# Patient Record
Sex: FEMALE | Race: WHITE | Employment: FULL TIME
[De-identification: names, ages, dates, MRNs, and addresses within clinical notes are randomized per-mention and may not be internally consistent; named-entity substitution may affect disease eponyms.]

---

## 2020-02-24 ENCOUNTER — HOSPITAL ENCOUNTER (OUTPATIENT)
Dept: PHYSICAL THERAPY | Facility: CLINIC | Age: 45
Setting detail: THERAPIES SERIES
Discharge: HOME | End: 2020-02-24
Payer: COMMERCIAL

## 2020-02-24 PROCEDURE — 97110 THERAPEUTIC EXERCISES: CPT

## 2020-02-24 PROCEDURE — 97140 MANUAL THERAPY 1/> REGIONS: CPT

## 2020-02-24 PROCEDURE — 97161 PT EVAL LOW COMPLEX 20 MIN: CPT

## 2020-02-24 NOTE — CONSULTS
[x] [facility]    [address] [place] [address]    Phone: [phone]    Fax:  [phone]     Physical Therapy Running Evaluation    Date:  2020  Patient: Oniel Pozo   : 1975  MRN: [id number]  Physician: Dr. Lucie Real: MMO(30 visits)  Medical Diagnosis: B knee pain    Rehab Codes: F51.715, M25.562  Onset date: 2/10/20     Next Dr's appt. : 20    Subjective:   CC: Pt reports that as she has been getting further into the SU[employer] program and ramping miles up she began having pain. Last weekend was 8 miles and when she was at 5 miles the knee pain started just below the knee cap. She was able to complete the run with some KT tape on but pain was 5/10. With ice post run it reduced to 2/10 and has resolved. She states she has cut back to 3 days per week of training as the day between gives time for it to calm down and she can run again. With faster paces it doesn't seem to hurt as much. Reported abimael on longer slower runs at 11:15 min/mile is in the high 150's to low 160's.       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 Drawer   []   Hip Flex WNL WNL   Post. Drawer   []   Ext WNL but stiff 10 4 4- Lachmans   []   ER WNL WNL 5 5 Valgus Stress   []   IR WNL WNL   Varus Stress   []   ABD WNL WNL 5 5- Bears   []   ADD     Pat-Fem Grind   []   Knee Flex WNL WNL 5 5 FADIRs   []   Ext WNL WNL   Hip Scouring   []   Ankle DF knee straight 5 3   MANJULAs   []   PF WNL WNL   Piriformis   []   INV WNL WNL   Tierras   []   EVER WNL WNL   Tomas   + + []   GTE WNL WNL   Abdon's   []       OBSERVATION No Deficit Deficit Not Tested Comments   Posture       Forward Head [] [] []    Rounded Shoulders [] [] []    Kyphosis [] [] []    Lordosis [] [] []    Scoliosis [] [] []    Iliac Crest [] [x] [] R anterior innom rotation   PSIS [] [] []    ASIS [] [] []    Genu Valgus [] [] []    Genu Varus [] [] []    Genu Recurvatum [] [] []    Pronation [] [] []    Supination [] [] []    Leg Length Discrp [] [] []    Slumped Sitting [] [] []    Palpation [] [x] [] Psoas, iliacus, glute med and piriformis spasm   Sensation [] [] []    Edema [] [] []    Neurological [] [] []    Patellar Mobility [] [] []    Patellar Orientation [] [] []    Gait [] [] [] Analysis:     Video Run Analysis- To be performed next visit as pt was 30 min late for her appt      FUNCTIONAL TESTS PAIN NO PAIN COMMENTS   Step Test  [] [] 4/6/8\"   2 legged squat [] []    1 legged squat [] [x] Dynamic genu valgus R>L   10x 1 legged squat  [] []    Posterior tibialis dysfunction [] []    # of 1 legged calf raises                          Comments:  Assessment:  Pt present with decreased ankle DF with knee straight as well as a lack of hip extension ROM on the R.  This lack of ROM along with glute max weakness and low abimael are likely the cause of mechanical knee pain over the course of her higher mileage runs    Problems:    [x] ? Pain:     [x] ? ROM:    [x] ? Strength:    [x] ? Function: Not able to run as she wishes   [] ? Balance  [] Increased edema:  [x] Postural Deviations  [x] Gait Deviations  [x]  UWRI score is 27/36  [] Other:      STG: (to be met in 6 treatments)  1. ? Pain:< 3/10 post run to allow pt to continue training for half marathon  2. ? ROM: normal hip extension and ankle DF to allow for improved glute activation and LE mechanics  3. ? Strength:5/5 hip strength to correct LE mechanics  4. ? Function: Able to hold corrections to pelvis and participate in video run analysis to decrease loading rates and improve use of glutes for stability  5. UWRI score to 30  6. Independent with Home Exercise Programs    LTG: (to be met in 12 treatments)  1. Pain 0/10 knee pain  2. Able to demonstrate little to no deviation with single leg squat  3. UWRI score to 36  4. Able to run as she wishes with acceptable run mechanics as seen on video analysis                 Patient goals:Figure out how to correct the problem  Rehab Potential:  [x] Good  [] Fair  [] Poor   Suggested Professional Referral:  [x] No  [] Yes:  Barriers to Goal Achievement[de-identified]  [x] No  [] Yes:  Domestic Concerns:  [x] No  [] Yes:    Pt. Education:  [x] Plans/Goals, Risks/Benefits discussed  [x] Home exercise program    Method of Education: [x] Verbal  [x] Demo  [x] Written  Comprehension of Education:  [] Verbalizes understanding. [] Demonstrates understanding. [] Needs Review. [] Demonstrates/verbalizes understanding of HEP/Ed previously given.     Treatment Plan:  [x] Therapeutic Exercise    [x] Therapeutic Activity  [x] Manual Therapy   [x] Alter G treadmill  [x] Phys perf test     [x] Vasocompression/Game Ready   [x] Neuromuscular Re-education [x] Instruction in HEP     [x] Aquatic Therapy                           Frequency:  1-2x/week for 12 visits    Todays Treatment:  Manual: MET to correct pelvic dysfunction, DI hip flexor, glute med and piriformis  Precautions: standard  Exercises:  Exercise Reps/ Time Weight/ Level Issued for HEP  MOBO Y/N Comments   Prone         Flying squirrels         Hip ext (glut max)         [place] hip ext next        Supine         2 legged bridges         1 legged bridges         PB hamstring curls      Hips to remain in neutral to ext   Posture Bridge marches next        Sidelying         Clams 90/30 deg         Lois hip abd         Quadruped         Donkey kicks      Bar across pelvis   [place] twist         Gym         Gastroc stretch 2'ea  x x  Towel for IN   Hip flexor stretch 2'ea  x x     Tippy bird      1/2 legged   Monster walks         Hip thrusts next        Step downs      Posterior and lateral   Posterior sling ex      On cable column   Ski jumper lunges         Functional reach         Reverse twisting lunge         RDLs      Retract scaps, one hand over, one hand under   Resisted C skip       Neutral L spine   Front squats      No L ext   Resisted Push Press         Ninja jumps      Soft landings on box; jump up/step down   Depth drops         Depth jumps      Jump down w/ low luis   Med ball cleans      Start on chest, elbows wide   TRX         hip add         Hamstring curl         Lower trap row                           Other:    Specific Instructions for next treatment: add as above and video run analysis    Treatment Charges: Mins Units   [x] Evaluation       [x]  Low       []  Moderate       []  High 15 1   [] Phys perf test     [x]  Ther Exercise 10 1   [x]  Manual Therapy 20 1   []  Ther Activities     []  Aquatics     []  Vasocompression     []  NMR       TOTAL TREATMENT TIME:     [facility]  Time Out:1720    Electronically signed by: Aye Waite PT    As we have a standing verbal order from Dr. Monico Driver, once signed, this eval/POC will serve as the formal written order.

## 2020-02-27 ENCOUNTER — HOSPITAL ENCOUNTER (OUTPATIENT)
Dept: PHYSICAL THERAPY | Facility: CLINIC | Age: 45
Setting detail: THERAPIES SERIES
Discharge: HOME | End: 2020-02-27
Payer: COMMERCIAL

## 2020-02-27 PROCEDURE — 97750 PHYSICAL PERFORMANCE TEST: CPT

## 2020-02-27 PROCEDURE — 97110 THERAPEUTIC EXERCISES: CPT

## 2020-02-27 PROCEDURE — 97140 MANUAL THERAPY 1/> REGIONS: CPT

## 2020-02-27 PROCEDURE — 97112 NEUROMUSCULAR REEDUCATION: CPT

## 2020-02-27 NOTE — FLOWSHEET NOTE
[] [facility]  [address]  [phone]  F: [phone] [] [address]  [facility]   [address]  P: [phone]  F: [phone] [x] [address]  Therapy  [address]  P: [phone]  F: [phone] [] [address]  [facility]   [address]   [place]: [phone]  F: [phone]      Physical Therapy Daily Treatment Note    Date:  2020  Patient Name:  Juan Carlos Domingo    :  1975  MRN: [id number]  Physician: Dr. Woodward Haresh: MMO(30 visits)  Medical Diagnosis: B knee pain                     Rehab Codes: M25.561, M25.562  Onset date: 2/10/20                             Next Dr's appt.: unknown  Visit# / total visits:     Cancels/No Shows: 0    Subjective:    Pain:  [] Yes  [] No Location: B knee Pain Rating: (0-10 scale) 1/10  Pain altered Tx:  [] No  [] Yes  Action:  Comments: Was able to run 4 miles.  Only mild  L knee pain    Objective:  Manual: MET to correct pelvic dysfunction, DI hip flexor, glute med and piriformis  Precautions: standard  Exercises:  Exercise Reps/ Time Weight/ Level Issued for HEP   MOBO Y/N Comments   Prone               Flying squirrels               Hip ext (glut max)               [place] hip ext x30    x  x       Supine               2 legged bridges               1 legged bridges               PB hamstring curls           Hips to remain in neutral to ext   Posture Bridge marches 2x10    x  x       Sidelying               Clams 90/30 deg               Lois hip abd               Quadruped               Donkey kicks           Bar across pelvis   [place] twist               Gym               Gastroc stretch 2'ea   x x   Towel for IN   Hip flexor stretch 2'ea   x x       Tippy bird           1/2 legged Monster walks               Hip thrusts x30    x  x       Step downs           Posterior and lateral   Posterior sling ex           On cable column   Ski jumper lunges               Functional reach               Reverse twisting lunge               RDLs           Retract scaps, one hand over, one hand under   Resisted C skip            Neutral L spine   Front squats           No L ext   Resisted Push Press               Ninja jumps           Soft landings on box; jump up/step down   Depth drops               Depth jumps           Jump down w/ low luis   Med ball cleans           Start on chest, elbows wide   TRX               hip add               Hamstring curl               Lower trap row                                               Other:     Specific Instructions for next treatment: continue glute strengthening, run in neutral shoe vs stability  Video Run Analysis   Warm up: 2min walk   Pace:11:32 min/mile   Duration: 5 minutes    Shoes: mizuno wave inspire   Shanita: 176 spm    Frontal plane deviations:    Little to no pronation    Contralateral pelvic drop    Increased hip adduction       Sagittal plane deviations:     Near full knee extension at initial contact    Elevated foot ground angle at initial contact    Limited knee flexion at midstance    Limited hip ext from midstance to toe off    Increased backward trunk lean     Not enough knee flexion in swing       Recommendations:     Increase shanita by 7-10%  NMR     Pace: 11:32 min/mile   Duration: 10 minutes    Shoes: mizuno wave inspire   Shanita: 176 spm    Cue: Metronome to cue shanita, 2 min barefoot run to reduce foot to ground angle, cue to lift heel at toe off        Treatment Charges: Mins Units   []  Modalities     [x]  Ther Exercise 20 1   [x]  Manual Therapy 15 1   []  Ther Activities     [x]  NMR 15 1   []  Vasocompression     [x]  Other- Physical performance eval 5 1   Total Treatment time 65 4       Assessment: [x] Progressing toward

## 2020-03-05 ENCOUNTER — HOSPITAL ENCOUNTER (OUTPATIENT)
Dept: PHYSICAL THERAPY | Facility: CLINIC | Age: 45
Setting detail: THERAPIES SERIES
Discharge: HOME | End: 2020-03-05
Payer: COMMERCIAL

## 2020-03-05 PROCEDURE — 97112 NEUROMUSCULAR REEDUCATION: CPT

## 2020-03-05 PROCEDURE — 97140 MANUAL THERAPY 1/> REGIONS: CPT

## 2020-03-05 PROCEDURE — 97110 THERAPEUTIC EXERCISES: CPT

## 2020-03-05 NOTE — FLOWSHEET NOTE
[] [facility]  [address]  [phone]  F: [phone] [] [address]  [facility]   [address]  P: [phone]  F: [phone] [x] [address]  Therapy  [facility]  P: [phone]  F: [phone] [] [address]  [facility]   [address]   [place]: [phone]  F: [phone]      Physical Therapy Daily Treatment Note    Date:  3/5/2020  Patient Name:  Caroline Amezquita    :  1975  MRN: [id number]  Physician: Dr. Rukhsana Mclaughlin: MMO(30 visits)  Medical Diagnosis: B knee pain                     Rehab Codes: M25.561, M25.562  Onset date: 2/10/20                             Next Dr's appt.: unknown  Visit# / total visits: 3/12    Cancels/No Shows: 0    Subjective:    Pain:  [] Yes  [] No Location: B knee Pain Rating: (0-10 scale) 1/10  Pain altered Tx:  [] No  [] Yes  Action:  Comments: Doing better    Objective:  Manual: DI L post tib, glute med and piriformis, SIdekick to L calf   Precautions: standard  Exercises:  Exercise Reps/ Time Weight/ Level Issued for HEP   MOBO Y/N Comments   Prone               Flying squirrels               Hip ext (glut max)               [place] hip ext x30    x  x       Supine               2 legged bridges               1 legged bridges               PB hamstring curls           Hips to remain in neutral to ext   Posture Bridge marches 2x10    x  x       Sidelying               Clams 90/30 deg               Lois hip abd               Quadruped               Donkey kicks           Bar across pelvis   [place] twist               Gym               Gastroc/soleus stretch 2'ea   x x   Towel for IN   Hip flexor stretch 2'ea   x        Tippy bird           1/2 legged   Monster walks               Hip thrusts x30    x  x       Step downs           Posterior and lateral   Posterior sling ex           On cable column   Ski jumper lunges               Functional reach               Reverse twisting lunge               RDLs           Retract scaps, one hand over, one hand under   Resisted C skip            Neutral L spine   Front squats           No L ext   Resisted Push Press               Ninja jumps           Soft landings on box; jump up/step down   Depth drops               Depth jumps           Jump down w/ low luis   Med ball cleans           Start on chest, elbows wide   TRX               hip add               Hamstring curl               Lower trap row                                               Other:     Specific Instructions for next treatment: continue glute strengthening        NMR     Pace: 11:32 min/mile   Duration: 15 minutes    Shoes: echo elsy inspire Ben Ghost   Shanita: 176 spm    Cue: Metronome to cue shanita cue to lift heel at toe off        Treatment Charges: Mins Units   []  Modalities     [x]  Ther Exercise 20 1   [x]  Manual Therapy 15 1   []  Ther Activities     [x]  NMR 15 1   []  Vasocompression     []  Other-      Total Treatment time 50 3       Assessment: [x] Progressing toward goals. Hip extension is normal today. L Posterior tib in spasm and tender to the touch. Pt was able to get some necessary pronation in the neutral shoe vs the stability shoe. Recommended use of a neutral shoe. [] No change.      [] Other:  [x] Patient would continue to benefit from skilled physical therapy services in order to: decrease knee pain through hip strengthening and run  corrections    STG: (to be met in 6 treatments)  1. ? Pain:< 3/10 post run to allow pt to continue training for half marathon  2. ? ROM: normal hip extension and ankle DF to allow for improved glute activation and LE mechanics  3. ? Strength:5/5 hip strength to correct LE mechanics  4. ? Function: Able to hold corrections to pelvis and participate in video run analysis to decrease loading rates and improve use of glutes for stability  5. UWRI score to 30  6. Independent with Home Exercise Programs     LTG: (to be met in 12 treatments)  1. Pain 0/10 knee pain  2. Able to demonstrate little to no deviation with single leg squat  3. UWRI score to 36  4. Able to run as she wishes with acceptable run mechanics as seen on video analysis                 Patient goals:Figure out how to correct the problem    Pt. Education:  [] Yes  [] No  [] Reviewed Prior HEP/Ed  Method of Education: [] Verbal  [] Demo  [] Written  Comprehension of Education:  [] Verbalizes understanding. [] Demonstrates understanding. [] Needs review. [] Demonstrates/verbalizes HEP/Ed previously given. Plan: [x] Continue for 10 visits toward short and long term goals.         Time In:  1533          Time Out: 1630    Electronically signed by:  Aye Waite, PT

## 2020-03-09 ENCOUNTER — HOSPITAL ENCOUNTER (OUTPATIENT)
Dept: PHYSICAL THERAPY | Facility: CLINIC | Age: 45
Setting detail: THERAPIES SERIES
Discharge: HOME | End: 2020-03-09
Payer: COMMERCIAL

## 2020-03-09 PROCEDURE — 97140 MANUAL THERAPY 1/> REGIONS: CPT

## 2020-03-09 PROCEDURE — 97110 THERAPEUTIC EXERCISES: CPT

## 2020-03-09 NOTE — FLOWSHEET NOTE
stretch 2'ea   x        Arm circles x30 Blue x x     Pull aparts x20 Blue x x     Tippy bird           1/2 legged   Monster walks               Hip thrusts x30    x         Step downs           Posterior and lateral   Posterior sling ex           On cable column   Ski jumper lunges               Functional reach               Reverse twisting lunge               RDLs           Retract scaps, one hand over, one hand under   Resisted C skip            Neutral L spine   Front squats           No L ext   Resisted Push Press               Ninja jumps           Soft landings on box; jump up/step down   Depth drops               Depth jumps           Jump down w/ low luis   Med ball cleans           Start on chest, elbows wide   TRX               hip add               Hamstring curl               Lower trap row                                               Other:     Specific Instructions for next treatment: continue glute strengthening, run in neutral shoe vs stability    NMR     Pace: 11:32 min/mile   Duration: 5 minutes    Shoes: mizuno wave inspire   Shanita: on own at 163 spm 172spm    Cue: Metronome to cue shanita         Treatment Charges: Mins Units   []  Modalities     [x]  Ther Exercise 30 2   [x]  Manual Therapy 20 1   []  Ther Activities     [x]  NMR 5 NC   []  Vasocompression     []  Other- Physical performance eval     Total Treatment time 55 3       Assessment: [x] Progressing toward goals. Pt is level at hips today but a check of thoracic spine due to an elevated L shldr revealed thoracic and rib dysfunction. This corrected nicely with mobilization and then pt was taught self STM technique with foam roller at her upper back. She is having a very difficult time with holding posture. Gave more postural corrective exercises focusing on scapular stabilizers this date. [] No change.      [] Other:  [x] Patient would continue to benefit from skilled physical therapy services in order to: decrease knee pain through hip strengthening and run  corrections    STG: (to be met in 6 treatments)  1. ? Pain:< 3/10 post run to allow pt to continue training for half marathon  2. ? ROM: normal hip extension and ankle DF to allow for improved glute activation and LE mechanics  3. ? Strength:5/5 hip strength to correct LE mechanics  4. ? Function: Able to hold corrections to pelvis and participate in video run analysis to decrease loading rates and improve use of glutes for stability  5. UWRI score to 30  6. Independent with Home Exercise Programs     LTG: (to be met in 12 treatments)  1. Pain 0/10 knee pain  2. Able to demonstrate little to no deviation with single leg squat  3. UWRI score to 36  4. Able to run as she wishes with acceptable run mechanics as seen on video analysis                 Patient goals:Figure out how to correct the problem    Pt. Education:  [] Yes  [] No  [] Reviewed Prior HEP/Ed  Method of Education: [] Verbal  [] Demo  [x] Written- add arm circles and pull aparts  Comprehension of Education:  [] Verbalizes understanding. [] Demonstrates understanding. [] Needs review. [] Demonstrates/verbalizes HEP/Ed previously given. Plan: [x] Continue for 10 visits toward short and long term goals. Follow up in 2 weeks for recheck.  Pt is to increase abimael and work on postural correction        Time In:  1605          Time Out: [address]    Electronically signed by:  Yanely Vasquez PT

## 2020-03-23 ENCOUNTER — APPOINTMENT (OUTPATIENT)
Dept: PHYSICAL THERAPY | Facility: CLINIC | Age: 45
End: 2020-03-23
Payer: COMMERCIAL

## 2020-06-16 ENCOUNTER — HOSPITAL ENCOUNTER (OUTPATIENT)
Dept: PHYSICAL THERAPY | Facility: CLINIC | Age: 45
Setting detail: THERAPIES SERIES
Discharge: HOME | End: 2020-06-16
Payer: COMMERCIAL

## 2020-06-16 PROCEDURE — 97164 PT RE-EVAL EST PLAN CARE: CPT

## 2020-06-16 PROCEDURE — 97110 THERAPEUTIC EXERCISES: CPT

## 2020-06-16 PROCEDURE — 97140 MANUAL THERAPY 1/> REGIONS: CPT

## 2020-06-19 ENCOUNTER — HOSPITAL ENCOUNTER (OUTPATIENT)
Dept: PHYSICAL THERAPY | Facility: CLINIC | Age: 45
Setting detail: THERAPIES SERIES
Discharge: HOME | End: 2020-06-19
Payer: COMMERCIAL

## 2020-06-19 PROCEDURE — 97110 THERAPEUTIC EXERCISES: CPT

## 2020-06-19 PROCEDURE — 97140 MANUAL THERAPY 1/> REGIONS: CPT

## 2020-06-19 NOTE — FLOWSHEET NOTE
[] [facility]  [address]  [phone]  F: [phone] [] [address]  [facility]   [address]  P: [phone]  F: [phone] [x] [place]  [address] [facility]  P: [phone]  F: [phone] [] [address]  [facility]   [address]   [place]: [phone]  F: [phone]      Physical Therapy Daily Treatment Note    Date:  2020  Patient Name:  Christiano Edmondson    :  1975  MRN: [id number]  Physician: Dr. Susana Pyle: MMO(30 visits)  Medical Diagnosis: B knee pain, L shin pain                    Rehab Codes: M25.561, M25.562, M79.662  Onset date: 2/10/20                             Next 's appt.: unknown  Visit# / total visits:     Cancels/No Shows: 0    Subjective:    Pain:  [] Yes  [] No Location: B knee Pain Rating: (0-10 scale)0-1/10  Pain altered Tx:  [] No  [] Yes  Action:  Comments: Pt Returns today stating her L calf and shin are feeling better. Went to the gym and did elliptical for 15 min without increased pain.   Objective:  Manual:Hypervolt L calves, DI glute med and piriformis L, hip flexor proximal and distal  Precautions: standard  Exercises:  Exercise Reps/ Time Weight/ Level Issued for HEP   MOBO Y/N Comments   Prone               Flying squirrels               Hip ext (glut max)               [place] hip ext x30    x         Supine               2 legged bridges               1 legged bridges               PB hamstring curls           Hips to remain in neutral to ext   Posture Bridge marches 2x10    x         Sidelying               Clams 90/30 deg               Lois hip abd               Quadruped               Donkey kicks           Bar across pelvis   [place] twist               Gym               /

## 2020-06-23 ENCOUNTER — HOSPITAL ENCOUNTER (OUTPATIENT)
Dept: PHYSICAL THERAPY | Facility: CLINIC | Age: 45
Setting detail: THERAPIES SERIES
Discharge: HOME | End: 2020-06-23
Payer: COMMERCIAL

## 2020-06-23 PROCEDURE — 97110 THERAPEUTIC EXERCISES: CPT

## 2020-06-23 PROCEDURE — 97112 NEUROMUSCULAR REEDUCATION: CPT

## 2020-06-23 PROCEDURE — 97140 MANUAL THERAPY 1/> REGIONS: CPT

## 2020-06-23 NOTE — FLOWSHEET NOTE
[] [facility]  [address]  [phone]  F: [phone] [] [address]  [facility]   [address]  P: [phone]  F: [phone] [x] [place]  [address] [facility]  P: [phone]  F: [phone] [] [address]  [facility]   [address]   [place]: [phone]  F: [phone]      Physical Therapy Daily Treatment Note    Date:  2020  Patient Name:  Randi [place]    :  1975  MRN: [id number]  Physician: Dr. Yusra Roa: MMO(30 visits)  Medical Diagnosis: B knee pain, L shin pain                    Rehab Codes: M25.561, M25.562, M79.662  Onset date: 2/10/20                             Next Dr's appt.: unknown  Visit# / total visits: 3/12    Cancels/No Shows: 0    Subjective:    Pain:  [] Yes  [] No Location: B knee Pain Rating: (0-10 scale)0/10  Pain altered Tx:  [] No  [] Yes  Action:  Comments: Pt reports a lot of muscular pain after last visit being on the [address] [place] [address]. Just like I worked out really hard.  Have been painfree  Objective:  Manual:Hypervolt L calves, DI glute med and piriformis L, hip flexor proximal and distal  Precautions: standard  Exercises:  Exercise Reps/ Time Weight/ Level Issued for HEP   MOBO Y/N Comments   Prone               Flying squirrels               Hip ext (glut max)               [place] hip ext x30    x         Supine               2 legged bridges               1 legged bridges               PB hamstring curls           Hips to remain in neutral to ext   Posture Bridge marches 2x10    x         Sidelying               Clams 90/30 deg               Lois hip abd               Quadruped               Donkey kicks           Bar across pelvis   [place] twist               Gym               1/2 kneel hip flexor

## 2020-06-26 ENCOUNTER — HOSPITAL ENCOUNTER (OUTPATIENT)
Dept: PHYSICAL THERAPY | Facility: CLINIC | Age: 45
Setting detail: THERAPIES SERIES
Discharge: HOME | End: 2020-06-26
Payer: COMMERCIAL

## 2020-06-26 PROCEDURE — 97112 NEUROMUSCULAR REEDUCATION: CPT

## 2020-06-26 PROCEDURE — 97140 MANUAL THERAPY 1/> REGIONS: CPT

## 2020-06-26 PROCEDURE — 97110 THERAPEUTIC EXERCISES: CPT

## 2020-06-29 ENCOUNTER — HOSPITAL ENCOUNTER (OUTPATIENT)
Dept: PHYSICAL THERAPY | Facility: CLINIC | Age: 45
Setting detail: THERAPIES SERIES
Discharge: HOME | End: 2020-06-29
Payer: COMMERCIAL

## 2020-06-29 PROCEDURE — 97112 NEUROMUSCULAR REEDUCATION: CPT

## 2020-06-29 NOTE — FLOWSHEET NOTE
[] [address]  [address]  [phone]  F: [phone] [] [address]  [facility]   [address]  P: [phone]  F: [phone] [x] [place]  [address] [facility]  P: [phone]  F: [phone] [] [address]  [facility]   [address]   [place]: [phone]  F: [phone]      Physical Therapy Daily Treatment Note    Date:  2020  Patient Name:  Ernesto Foreman    :  1975  MRN: [id number]  Physician: Dr. Bhupinder Rascon: MMO(30 visits)  Medical Diagnosis: B knee pain, L shin pain                    Rehab Codes: M25.561, M25.562, M79.662  Onset date: 2/10/20                             Next Dr's appt.: unknown  Visit# / total visits:     Cancels/No Shows: 0    Subjective:    Pain:  [] Yes  [] No Location: B knee Pain Rating: (0-10 scale)0/10  Pain altered Tx:  [] No  [] Yes  Action:  Comments: Pt reports no pain after last visit. Felt good.   Limited time today though due to another appt at 9am.  Objective:  Manual: held today  Precautions: standard  Exercises:  Exercise Reps/ Time Weight/ Level Issued for HEP   MOBO Y/N Comments   Prone               Flying squirrels               Hip ext (glut max)               [place] hip ext x30    x         Supine               2 legged bridges               1 legged bridges               PB hamstring curls           Hips to remain in neutral to ext   Posture Bridge marches 2x10    x         Sidelying               Clams 90/30 deg               Lois hip abd               Quadruped               Donkey kicks           Bar across pelvis   [place] twist               Gym               1/2 kneel hip flexor stretch w/ femoral IR 2'ea  x      Hip ER/abd in SL stance w/opp foot on wall 2x15  x when she maintains higher abimael. [] Other:  [x] Patient would continue to benefit from skilled physical therapy services in order to: Increase neuromuscular control and correct ROM block at ankle and foot on the L    STG: (to be met in 6 treatments)  1. ? Pain:< 3/10 post run to allow pt to continue training for half marathon  2. ? ROM: normal hip extension and ankle DF to allow for improved glute activation and LE mechanics  3. ? Strength:5/5 hip strength to correct LE mechanics  4. ? Function: Able to hold corrections to pelvis and participate in video run analysis to decrease loading rates and improve use of glutes for stability  5. UWRI score to 30  6. Independent with Home Exercise Programs     LTG: (to be met in 12 treatments)  1. Pain 0/10 knee pain  2. Able to demonstrate little to no deviation with single leg squat  3. UWRI score to 36  4. Able to run as she wishes with acceptable run mechanics as seen on video analysis                 Patient goals:Figure out how to correct the problem    Pt. Education:  [] Yes  [] No  [] Reviewed Prior HEP/Ed  Method of Education: [] Verbal  [] Demo  [x] Written- add arm circles and pull aparts  Comprehension of Education:  [] Verbalizes understanding. [] Demonstrates understanding. [] Needs review. [] Demonstrates/verbalizes HEP/Ed previously given.      Plan: [x] Continue to work on neuromuscular control and hip strength to correct mechanics        Time In:  0805       Time Out: 0840    Electronically signed by:  Augustin Nguyen, PT

## 2020-06-30 ENCOUNTER — APPOINTMENT (OUTPATIENT)
Dept: PHYSICAL THERAPY | Facility: CLINIC | Age: 45
End: 2020-06-30
Payer: COMMERCIAL

## 2020-07-02 ENCOUNTER — HOSPITAL ENCOUNTER (OUTPATIENT)
Dept: PHYSICAL THERAPY | Facility: CLINIC | Age: 45
Setting detail: THERAPIES SERIES
Discharge: HOME | End: 2020-07-02
Payer: COMMERCIAL

## 2020-07-02 PROCEDURE — 97112 NEUROMUSCULAR REEDUCATION: CPT

## 2020-07-02 PROCEDURE — 97110 THERAPEUTIC EXERCISES: CPT

## 2020-07-02 NOTE — FLOWSHEET NOTE
[] [address]  [address]  [phone]  F: [phone] [] [address]  [facility] 36   [address]  P: [phone]  F: [phone] [x] [address]  [address] [facility]  P: [phone]  F: [phone] [] [address]  [facility]   [address]   [place]: [phone]  F: [phone]      Physical Therapy Daily Treatment Note    Date:  2020  Patient Name:  Vivien Fernando    :  1975  MRN: [id number]  Physician: Dr. Flowers Guess: MMO(30 visits)  Medical Diagnosis: B knee pain, L shin pain                    Rehab Codes: M25.561, M25.562, M79.662  Onset date: 2/10/20                             Next Dr's appt.: unknown  Visit# / total visits:     Cancels/No Shows: 0    Subjective:    Pain:  [] Yes  [] No Location: B knee Pain Rating: (0-10 scale)0/10  Pain altered Tx:  [] No  [] Yes  Action:  Comments: Pt states she had no pain or awareness at the shin after last visit.   Objective:  Manual: held today  Precautions: standard  Exercises:  Exercise Reps/ Time Weight/ Level Issued for HEP   MOBO Y/N Comments   Prone               Flying squirrels               Hip ext (glut max)               [place] hip ext x30    x         Supine               2 legged bridges               1 legged bridges               PB hamstring curls           Hips to remain in neutral to ext   Posture Bridge marches 2x10    x         Sidelying               Clams 90/30 deg               Lois hip abd               Quadruped               Donkey kicks           Bar across pelvis   [place] twist               Gym               1/2 kneel hip flexor stretch w/ femoral IR 2'ea  x x     Hip ER/abd in SL stance w/opp foot on wall 2x15  x      Lax ball foot self mob x20 Burrito Gastroc stretch 2'   x x     Toe yoga x30   x        MOBO         Foot rocks x20   x Y    Power stride x20        Powerstride w/HR x5        HR x10   x Y    KB pass x10 Purple  x Y             Arm circles x30 Blue x      Pull aparts x20 Blue x      Tippy bird           1/2 legged   Monster walks               Hip thrusts x30    x  x       Step downs           Posterior and lateral   Posterior sling ex           On cable column   Ski jumper lunges               Functional reach               Reverse twisting lunge               RDLs           Retract scaps, one hand over, one hand under   Resisted C skip            Neutral L spine   Front squats           No L ext   Resisted Push Press               Ninja jumps           Soft landings on box; jump up/step down   Depth drops               Depth jumps           Jump down w/ low luis   Med ball cleans           Start on chest, elbows wide   AlterG  85%BW             TM run/walk  3on/2offx6  2.5mph/5.7mph    x    3 min walk TRINIDAD                                                                 Other:     Specific Instructions for next treatment: work on [employer]ie [employer]            Treatment Charges: Mins Units   []  Modalities     [x]  Ther Exercise 30 2   []  Manual Therapy     []  Ther Activities     [x]  NMR 30 2   []  Vasocompression     Total Treatment time 60 4       Assessment: [x] Progressing toward goals. Difficulty maintaining desired abimael but did not feel symptoms. Also still not natural to lift heel at toe off. Instability with exercises still noted. Continue to work on strength and proprioception. [] Other:  [x] Patient would continue to benefit from skilled physical therapy services in order to:  Increase neuromuscular control and correct ROM block at ankle and foot on the L    STG: (to be met in 6 treatments)  1. ? Pain:< 3/10 post run to allow pt to continue training for half marathon  2. ? ROM: normal hip extension and ankle DF to allow for improved glute activation and LE mechanics  3. ? Strength:5/5 hip strength to correct LE mechanics  4. ? Function: Able to hold corrections to pelvis and participate in video run analysis to decrease loading rates and improve use of glutes for stability  5. UWRI score to 30  6. Independent with Home Exercise Programs     LTG: (to be met in 12 treatments)  1. Pain 0/10 knee pain  2. Able to demonstrate little to no deviation with single leg squat  3. UWRI score to 36  4. Able to run as she wishes with acceptable run mechanics as seen on video analysis                 Patient goals:Figure out how to correct the problem    Pt. Education:  [] Yes  [] No  [] Reviewed Prior HEP/Ed  Method of Education: [] Verbal  [] Demo  [x] Written- add arm circles and pull aparts  Comprehension of Education:  [] Verbalizes understanding. [] Demonstrates understanding. [] Needs review. [] Demonstrates/verbalizes HEP/Ed previously given.      Plan: [x] Continue to work on neuromuscular control and hip strength to correct mechanics        Time In:1700    Time Out: 1800    Electronically signed by:  Corinna Oneill PT

## 2020-07-07 ENCOUNTER — HOSPITAL ENCOUNTER (OUTPATIENT)
Dept: PHYSICAL THERAPY | Facility: CLINIC | Age: 45
Setting detail: THERAPIES SERIES
Discharge: HOME | End: 2020-07-07
Payer: COMMERCIAL

## 2020-07-07 PROCEDURE — 97140 MANUAL THERAPY 1/> REGIONS: CPT

## 2020-07-07 PROCEDURE — 97110 THERAPEUTIC EXERCISES: CPT

## 2020-07-07 PROCEDURE — 97112 NEUROMUSCULAR REEDUCATION: CPT

## 2020-07-07 NOTE — FLOWSHEET NOTE
[] [address]  [address]  [phone]  F: [phone] [] [address]  [facility]   [address]  P: [phone]  F: [phone] [x] [address]  Therapy  [address] [facility]  P: [phone]  F: [phone] [] [address]  [facility]   [address]   [place]: [phone]  F: [phone]      Physical Therapy Daily Treatment Note    Date:  2020  Patient Name:  Lucas Kaur    :  1975  MRN: [id number]  Physician: Dr. Mcdonough [place]: RIMAO(30 visits)  Medical Diagnosis: B knee pain, L shin pain                    Rehab Codes: M25.561, M25.562, M79.662  Onset date: 2/10/20                             Next 's appt.: unknown  Visit# / total visits:     Cancels/No Shows: 0    Subjective:    Pain:  [] Yes  [] No Location: B knee Pain Rating: (0-10 scale)0/10  Pain altered Tx:  [] No  [] Yes  Action:  Comments: Pt states she has no pain and less of an awareness that L LE had an issue.    Objective:  Manual: Hypervolt B calves, DI glute med and pirifomis, Hypervolt  TFL  Precautions: standard  Exercises:  Exercise Reps/ Time Weight/ Level Issued for HEP   MOBO Y/N Comments   Prone               Flying squirrels               Hip ext (glut max)               [place] hip ext x30    x         Supine               2 legged bridges               1 legged bridges               PB hamstring curls           Hips to remain in neutral to ext   Posture Bridge marches 2x10    x         Sidelying               Clams 90/30 deg               [place] hip abd               Quadruped               Donkey kicks           Bar across pelvis   [place] twist               Gym               1/2 kneel hip flexor stretch w/ femoral IR 2'ea  x x     Hip ER/abd in SL stance w/opp foot on wall 2x15 orange x x Y    Lax ball foot self mob x20        Burrito Gastroc stretch 2'   x x     Toe yoga x30   x        MOBO         Foot rocks x20   x Y    Power stride x20        Powerstride w/HR x5        HR x10   x Y    KB pass x10 Purple  x Y             Arm circles x30 Blue x      Pull aparts x20 Blue x      Tippy bird           1/2 legged   Monster walks               Hip thrusts x30    x  x       Step downs           Posterior and lateral   Posterior sling ex           On cable column   Ski jumper lunges               Functional reach               Reverse twisting lunge               RDLs           Retract scaps, one hand over, one hand under   Resisted C skip            Neutral L spine   Front squats           No L ext   Resisted Push Press               Ninja jumps           Soft landings on box; jump up/step down   Depth drops               Depth jumps           Jump down w/ low luis   Med ball cleans           Start on chest, elbows wide   AlterG  85%BW x3 90% x2             TM run/walk  4on/1offx5    2.5mph/5.7mph    x    1 min walk TRINIDAD                                                                 Other:     Specific Instructions for next treatment: work on [employer]            Treatment Charges: Mins Units   []  Modalities     [x]  Ther Exercise 20 1   [x]  Manual Therapy 15 1   []  Ther Activities     [x]  NMR 25 2   []  Vasocompression     Total Treatment time 70 4       Assessment: [x] Progressing toward goals. Pt was able to more easily maintain abimael and lift heel at toe off making a positive change in tibial inclination angle with it being very near perpendicular to ground with abimael at 176spm    [] Other:  [x] Patient would continue to benefit from skilled physical therapy services in order to:  Increase neuromuscular control and correct ROM block at ankle and foot on the L    STG: (to be met in 6 treatments)  1. ? Pain:< 3/10 post run to allow pt to continue training for half marathon  2. ? ROM: normal hip extension and ankle DF to allow for improved glute activation and LE mechanics  3. ? Strength:5/5 hip strength to correct LE mechanics  4. ? Function: Able to hold corrections to pelvis and participate in video run analysis to decrease loading rates and improve use of glutes for stability  5. UWRI score to 30  6. Independent with Home Exercise Programs     LTG: (to be met in 12 treatments)  1. Pain 0/10 knee pain  2. Able to demonstrate little to no deviation with single leg squat  3. UWRI score to 36  4. Able to run as she wishes with acceptable run mechanics as seen on video analysis                 Patient goals:Figure out how to correct the problem    Pt. Education:  [] Yes  [] No  [] Reviewed Prior HEP/Ed  Method of Education: [] Verbal  [] Demo  [x] Written- add arm circles and pull aparts  Comprehension of Education:  [] Verbalizes understanding. [] Demonstrates understanding. [] Needs review. [] Demonstrates/verbalizes HEP/Ed previously given.      Plan: [x] Continue to work on neuromuscular control and hip strength to correct mechanics        Time In:1000    Time Out: 1115    Electronically signed by:  Barby Powell, PT

## 2020-07-09 ENCOUNTER — HOSPITAL ENCOUNTER (OUTPATIENT)
Dept: PHYSICAL THERAPY | Facility: CLINIC | Age: 45
Setting detail: THERAPIES SERIES
Discharge: HOME | End: 2020-07-09
Payer: COMMERCIAL

## 2020-07-09 PROCEDURE — 97140 MANUAL THERAPY 1/> REGIONS: CPT

## 2020-07-09 PROCEDURE — 97112 NEUROMUSCULAR REEDUCATION: CPT

## 2020-07-09 PROCEDURE — 97110 THERAPEUTIC EXERCISES: CPT

## 2020-07-09 NOTE — FLOWSHEET NOTE
[] [address]  [address]  [phone]  F: [phone] [] [address]  [facility] 36   [address]  P: [phone]  F: [phone] [x] [address] &  Therapy  [address] [facility]  P: [phone]  F: [phone] [] [address]  [facility]   [address]   [place]: [phone]  F: [phone]      Physical Therapy Daily Treatment Note    Date:  2020  Patient Name:  Camryn Funk    :  1975  MRN: [id number]  Physician: Dr. Tamara Gray: RIMAO(30 visits)  Medical Diagnosis: B knee pain, L shin pain                    Rehab Codes: A83.166, M25.562, M79.662  Onset date: 2/10/20                             Next 's appt.: unknown  Visit# / total visits:     Cancels/No Shows: 0    Subjective:    Pain:  [] Yes  [] No Location: B knee Pain Rating: (0-10 scale)0/10  Pain altered Tx:  [] No  [] Yes  Action:  Comments: Pt states she did feel a little by the end of the night on Tues after running at 90%BW   Objective:  Manual: Hypervolt B calves, DI glute med and pirifomis, Hypervolt  TFL  Precautions: standard  Exercises:  Exercise Reps/ Time Weight/ Level Issued for HEP   MOBO Y/N Comments   Prone               Flying squirrels               Hip ext (glut max)               [place] hip ext x30    x         Supine               2 legged bridges               1 legged bridges               PB hamstring curls           Hips to remain in neutral to ext   Posture Bridge marches 2x10    x         Sidelying               Clams 90/30 deg               [place]ois hip abd               Quadruped               Donkey kicks           Bar across pelvis   [place] twist               Gym               1/2 kneel hip flexor stretch w/ femoral IR 2'ea  x x     Hip ER/abd in SL stance w/opp foot on wall 2x15 orange x  Y    Lax ball foot self mob x20        Burrito Gastroc stretch 2'   x x  gastroc and soleus   Toe yoga x30   x        MOBO         Foot rocks x20    Y    Power stride x20        Powerstride w/HR x5        HR x10    Y    KB pass x10 Purple   Y             Arm circles x30 Blue x      Pull aparts x20 Blue x      Tippy bird           1/2 legged   Monster walks               Hip thrusts x30    x         Step downs           Posterior and lateral   Posterior sling ex           On cable column   Ski jumper lunges               Functional reach               Reverse twisting lunge               RDLs           Retract scaps, one hand over, one hand under   Resisted C skip            Neutral L spine   Front squats           No L ext   Resisted Push Press               Ninja jumps           Soft landings on box; jump up/step down   Depth drops               Depth jumps           Jump down w/ low luis   Med ball cleans           Start on chest, elbows wide   AlterG  85%BW              TM run/walk  4on/2offx5    2.5mph/5.7mph    x    2 min walk TRINIDAD                                                                 Other:     Specific Instructions for next treatment: work on [employer]            Treatment Charges: Mins Units   []  Modalities     [x]  Ther Exercise 20 1   [x]  Manual Therapy 15 1   []  Ther Activities     [x]  NMR 20 1   []  Vasocompression     Total Treatment time 55 4       Assessment: [x] Progressing toward goals. Pt was able to maintain abimael only with use of metronome. Normal hip extension but still tight at calves. Needs to unlock hip more with exercises. [] Other:  [x] Patient would continue to benefit from skilled physical therapy services in order to:  Increase neuromuscular control and correct ROM block at ankle and foot on the L    STG: (to be met in 6 treatments)  1. ? Pain:< 3/10 post run to allow pt to continue training for half marathon  2. ? ROM: normal hip extension and ankle DF to allow for improved glute activation and LE mechanics  3. ? Strength:5/5 hip strength to correct LE mechanics  4. ? Function: Able to hold corrections to pelvis and participate in video run analysis to decrease loading rates and improve use of glutes for stability  5. UWRI score to 30  6. Independent with Home Exercise Programs     LTG: (to be met in 12 treatments)  1. Pain 0/10 knee pain  2. Able to demonstrate little to no deviation with single leg squat  3. UWRI score to 36  4. Able to run as she wishes with acceptable run mechanics as seen on video analysis                 Patient goals:Figure out how to correct the problem    Pt. Education:  [] Yes  [] No  [] Reviewed Prior HEP/Ed  Method of Education: [] Verbal  [] Demo  [x] Written- add arm circles and pull aparts  Comprehension of Education:  [] Verbalizes understanding. [] Demonstrates understanding. [] Needs review. [] Demonstrates/verbalizes HEP/Ed previously given.      Plan: [x] Continue to work on neuromuscular control and hip strength to correct mechanics        Time In:0900   Time Out: 1000    Electronically signed by:  Joeann Dancer, PT

## 2020-07-13 ENCOUNTER — HOSPITAL ENCOUNTER (OUTPATIENT)
Dept: PHYSICAL THERAPY | Facility: CLINIC | Age: 45
Setting detail: THERAPIES SERIES
Discharge: HOME | End: 2020-07-13
Payer: COMMERCIAL

## 2020-07-13 PROCEDURE — 97110 THERAPEUTIC EXERCISES: CPT

## 2020-07-13 PROCEDURE — 97112 NEUROMUSCULAR REEDUCATION: CPT

## 2020-07-13 PROCEDURE — 97140 MANUAL THERAPY 1/> REGIONS: CPT

## 2020-07-13 NOTE — FLOWSHEET NOTE
kneel hip flexor stretch w/ femoral IR 2'ea  x x     Hip ER/abd in SL stance w/opp foot on wall 2x15 orange x  Y    Lax ball foot self mob x20        Burrito Gastroc stretch 2'   x x  gastroc and soleus   Toe yoga x30   x        MOBO         Star squat x15 reps total   x  In stocking feet for HEP   Foot rocks x20    Y    Power stride x20        Powerstride w/HR x5        HR x10    Y    KB pass x10 Purple   Y             Arm circles x30 Blue x      Pull aparts x20 Blue x      Tippy bird           1/2 legged   Monster walks               Hip thrusts x30    x         Step downs           Posterior and lateral   Posterior sling ex           On cable column   Ski jumper lunges               Functional reach               Reverse twisting lunge               RDLs           Retract scaps, one hand over, one hand under   Resisted C skip            Neutral L spine   Front squats           No L ext   Resisted Push Press               Ninja jumps           Soft landings on box; jump up/step down   Depth drops               Depth jumps           Jump down w/ low luis   Med ball cleans           Start on chest, elbows wide   AlterG  90%BW              TM run/walk  4on/2offx5    2.5mph/5.7mph    x    2 min walk TRINIDAD                                                                 Other:     Specific Instructions for next treatment: work on [employer]            Treatment Charges: Mins Units   []  Modalities     [x]  Ther Exercise 20 1   [x]  Manual Therapy 15 1   []  Ther Activities     [x]  NMR 15 1   []  Vasocompression     Total Treatment time 50 3       Assessment: [x] Progressing toward goals. R medial gastroc remains tight. Relieved with manual. L glute med and piriformis in spasm but relieved with manual as well. Greater ease lifting heel at toe off today. Still tends to stay stiff at trunk not allowing hip to hinge as desired at midstance.  Had a difficult time performing star squat due to quad dominance   [] Other:  [x] Patient would continue to benefit from skilled physical therapy services in order to: Increase neuromuscular control and correct ROM block at ankle and foot on the L    STG: (to be met in 6 treatments)  1. ? Pain:< 3/10 post run to allow pt to continue training for half marathon  2. ? ROM: normal hip extension and ankle DF to allow for improved glute activation and LE mechanics  3. ? Strength:5/5 hip strength to correct LE mechanics  4. ? Function: Able to hold corrections to pelvis and participate in video run analysis to decrease loading rates and improve use of glutes for stability  5. UWRI score to 30  6. Independent with Home Exercise Programs     LTG: (to be met in 12 treatments)  1. Pain 0/10 knee pain  2. Able to demonstrate little to no deviation with single leg squat  3. UWRI score to 36  4. Able to run as she wishes with acceptable run mechanics as seen on video analysis                 Patient goals:Figure out how to correct the problem    Pt. Education:  [] Yes  [] No  [] Reviewed Prior HEP/Ed  Method of Education: [] Verbal  [] Demo  [x] Written- add arm circles and pull aparts  Comprehension of Education:  [] Verbalizes understanding. [] Demonstrates understanding. [] Needs review. [] Demonstrates/verbalizes HEP/Ed previously given.      Plan: [x] Continue to work on neuromuscular control and hip strength to correct mechanics        Time In:0805 Time Out: 9601    Electronically signed by:  Barby Powell, PT

## 2020-07-17 ENCOUNTER — HOSPITAL ENCOUNTER (OUTPATIENT)
Dept: PHYSICAL THERAPY | Facility: CLINIC | Age: 45
Setting detail: THERAPIES SERIES
Discharge: HOME | End: 2020-07-17
Payer: COMMERCIAL

## 2020-07-17 PROCEDURE — 97110 THERAPEUTIC EXERCISES: CPT

## 2020-07-17 PROCEDURE — 97112 NEUROMUSCULAR REEDUCATION: CPT

## 2020-07-17 PROCEDURE — 97140 MANUAL THERAPY 1/> REGIONS: CPT

## 2020-07-17 NOTE — FLOWSHEET NOTE
on wall 2x15 orange x  Y    Lax ball foot self mob x20        Burrito Gastroc stretch 2'   x x  gastroc and soleus   Squat to chair x30  x x  Chair at knees as a cue   Impact lunge 2x10  x x     skaters x20  x x     Toe yoga x30   x        MOBO         Star squat x15 reps total     In stocking feet for HEP   Foot rocks x20    Y    Power stride x20        Powerstride w/HR x5        HR x10    Y    KB pass x10 Purple   Y             Arm circles x30 Blue x      Pull aparts x20 Blue x      Tippy bird           1/2 legged   Monster walks               Hip thrusts x30    x         Step downs           Posterior and lateral   Posterior sling ex           On cable column   Ski jumper lunges               Functional reach               Reverse twisting lunge               RDLs           Retract scaps, one hand over, one hand under   Resisted C skip            Neutral L spine   Front squats           No L ext   Resisted Push Press               Ninja jumps           Soft landings on box; jump up/step down   Depth drops               Depth jumps           Jump down w/ low luis   Med ball cleans           Start on chest, elbows wide   AlterG  90%BW   med short           TM run/walk  4on/2offx6    2.5mph/5.7mph    x    2 min walk TRINIDAD                                                                 Other:     Specific Instructions for next treatment: Alter G or transition out to FWB on TM to run           Treatment Charges: Mins Units   []  Modalities     [x]  Ther Exercise 30 2   [x]  Manual Therapy 15 1   []  Ther Activities     [x]  NMR 20 1   []  Vasocompression     Total Treatment time 65 4       Assessment: [x] Progressing toward goals. Calves are much looser overall. L achilles decreased tightness post IASTM.  Added plyo post run to work on [address] [employer] activation upon landing as pt tend to be quad and erector spinae dominant instead of hinging at the hip   [] Other:  [x] Patient would continue to benefit from skilled physical therapy services in order to: Increase neuromuscular control and correct ROM block at ankle and foot on the L    STG: (to be met in 6 treatments)  1. ? Pain:< 3/10 post run to allow pt to continue training for half marathon  2. ? ROM: normal hip extension and ankle DF to allow for improved glute activation and LE mechanics  3. ? Strength:5/5 hip strength to correct LE mechanics  4. ? Function: Able to hold corrections to pelvis and participate in video run analysis to decrease loading rates and improve use of glutes for stability  5. UWRI score to 30  6. Independent with Home Exercise Programs     LTG: (to be met in 12 treatments)  1. Pain 0/10 knee pain  2. Able to demonstrate little to no deviation with single leg squat  3. UWRI score to 36  4. Able to run as she wishes with acceptable run mechanics as seen on video analysis                 Patient goals:Figure out how to correct the problem    Pt. Education:  [] Yes  [] No  [] Reviewed Prior HEP/Ed  Method of Education: [] Verbal  [] Demo  [x] Written- add arm circles and pull aparts  Comprehension of Education:  [] Verbalizes understanding. [] Demonstrates understanding. [] Needs review. [] Demonstrates/verbalizes HEP/Ed previously given.      Plan: [x] Continue to work on neuromuscular control and hip strength to correct mechanics        Time In:1200Time Out: 1315    Electronically signed by:  Festus Jefferson, PT

## 2020-07-20 ENCOUNTER — HOSPITAL ENCOUNTER (OUTPATIENT)
Dept: PHYSICAL THERAPY | Facility: CLINIC | Age: 45
Setting detail: THERAPIES SERIES
Discharge: HOME | End: 2020-07-20
Payer: COMMERCIAL

## 2020-07-20 PROCEDURE — 97140 MANUAL THERAPY 1/> REGIONS: CPT

## 2020-07-20 PROCEDURE — 97110 THERAPEUTIC EXERCISES: CPT

## 2020-07-20 NOTE — FLOWSHEET NOTE
[] [address] [address]  [address]  [phone]  F: [phone] [] [address]  [facility] 36   [address]  P: [phone]  F: [phone] [x] [place]  [address] [facility]  P: [phone]  F: [phone] [] [address]  [facility]   [address]   [place]: [phone]  F: [phone]      Physical Therapy Daily Treatment Note    Date:  2020  Patient Name:  Cassia Mohamud    :  1975  MRN: [id number]  Physician: Dr. Arvel Rubinstein: MMO(30 visits)  Medical Diagnosis: B knee pain, L shin pain                    Rehab Codes: M25.561, M25.562, M79.662  Onset date: 2/10/20                             Next 's appt.: 8/10/20  Visit# / total visits:     Cancels/No Shows: 0    Subjective:    Pain:  [] Yes  [x] No Location: B knee Pain Rating: (0-10 scale)0/10  Pain altered Tx:  [] No  [] Yes  Action:  Comments: Pt reports she feels no pain in lower legs, but L hip feels a little tight.       Objective:  Manual:Sidekick L achilles, Hypervolt B calf,DI glute med and pirifomis  Precautions: standard  Exercises:  Exercise Reps/ Time Weight/ Level Issued for HEP   MOBO Y/N Comments   Prone               Flying squirrels               Hip ext (glut max)               [place] hip ext x30    x         Supine               2 legged bridges               1 legged bridges               PB hamstring curls           Hips to remain in neutral to ext   Posture Bridge marches 2x10    x         Gym               1/2 kneel hip flexor stretch w/ femoral IR 2'ea  x x     Hip ER/abd in SL stance w/opp foot on wall 2x15 orange x  Y    Lax ball foot self mob x20        Burrito Gastroc stretch 2'   x x  gastroc and soleus   Squat to chair x30  x x  Chair at knees as a cue Impact lunge 2x10  x x     skaters x20  x x     Toe yoga x30   x        MOBO         Star squat x15 reps total     In stocking feet for HEP   Foot rocks x20    Y    Power stride x20        Powerstride w/HR x5        HR x10    Y    KB pass x10 Purple   Y    Chair squats 20  x x  Added 7/20   Arm circles x30 Blue x      Pull aparts x20 Blue x      Reverse split squats 20  x x  Added 7/20   Monster walks               Hip thrusts x30    x         TM run/walk   (treadmill) 4on/2  offx6   2.5/5. 2mph    x    2 min walk TRINIDAD   Other:     Specific Instructions for next treatment:  she can run on the treadmill every other day with today's parameters for 1 wk and then advance to 30 min consecutive      Treatment Charges: Mins Units   []  Modalities     [x]  Ther Exercise 49 3   [x]  Manual Therapy 8 1   []  Ther Activities     []  NMR     []  Vasocompression     Total Treatment time 57 4       Assessment: [x] Progressing toward goals. Calf was not tender and pt opted not to receive IASTM and also opted to advance to treadmill, which was a choice by primary PT. This was an advancement from 90% to 100%. Pt did have moderate glut med and max tenderness, which resolved quickly with DI. Reviewed frontal and sagittal mechanics and no significant issues. L great toe had an audible click during the last run segment, non painful. She had full MTP and IP ROM with minimal tnderness on PF. Added split squats and chair squats for increased glut max activation    [] Other:  [x] Patient would continue to benefit from skilled physical therapy services in order to:  Increase neuromuscular control and correct ROM block at ankle and foot on the L    STG: (to be met in 6 treatments)  1. ? Pain:< 3/10 post run to allow pt to continue training for half marathon  2. ? ROM: normal hip extension and ankle DF to allow for improved glute activation and LE mechanics  3. ? Strength:5/5 hip strength to correct LE mechanics  4. ? Function: Able to hold corrections to pelvis and participate in video run analysis to decrease loading rates and improve use of glutes for stability  5. UWRI score to 30  6. Independent with Home Exercise Programs     LTG: (to be met in 12 treatments)  1. Pain 0/10 knee pain  2. Able to demonstrate little to no deviation with single leg squat  3. UWRI score to 36  4. Able to run as she wishes with acceptable run mechanics as seen on video analysis                 Patient goals:Figure out how to correct the problem    Pt. Education:  [] Yes  [] No  [] Reviewed Prior HEP/Ed  Method of Education: [x] Verbal  [] Demo  [] Written-  Comprehension of Education:  [] Verbalizes understanding. [x] Demonstrates understanding. [] Needs review. [] Demonstrates/verbalizes HEP/Ed previously given.      Plan: [x] Continue to work on neuromuscular control and hip strength to correct mechanics        Time In:0903  Time Out: [address]    Electronically signed by:  Chrissie Garcia, PT

## 2020-07-24 ENCOUNTER — HOSPITAL ENCOUNTER (OUTPATIENT)
Dept: PHYSICAL THERAPY | Facility: CLINIC | Age: 45
Setting detail: THERAPIES SERIES
Discharge: HOME | End: 2020-07-24
Payer: COMMERCIAL

## 2020-07-24 PROCEDURE — 97110 THERAPEUTIC EXERCISES: CPT

## 2020-07-24 NOTE — FLOWSHEET NOTE
[] [facility]  [address]  [phone]  F: [phone] [] [address]  [facility] 36   [address]  P: [phone]  F: [phone] [x] [place]  [address] [facility]  P: [phone]  F: [phone] [] [address]  [facility]   [address]   [place]: [phone]  F: [phone]      Physical Therapy Daily Treatment Note    Date:  2020  Patient Name:  Brandy Bob    :  1975  MRN: [id number]  Physician: Dr. Katie Price: MMO(30 visits)  Medical Diagnosis: B knee pain, L shin pain      Rehab Codes: M25.561, M25.562, M79.662  Onset date: 2/10/20                             Next Dr's appt.: 8/10/20  Visit# / total visits:     Cancels/No Shows: 0    Subjective:    Pain:  [] Yes  [x] No Location: B knee Pain Rating: (0-10 scale)0/10  Pain altered Tx:  [] No  [] Yes  Action:  Comments: Pt reports she ran on Wednesday and no issues. States that she does not need any manual based on how good she feels. She does want to know   How to stretch her calf more than the burrito stretch.      Objective:  Manual:none  Precautions: standard  Exercises:  Exercise Reps/ Time Weight/ Level Issued for HEP   MOBO Y/N Comments   Prone               [place] hip ext x30    x         Supine               Posture Bridge marches 2x10    x         Gym               1/2 kneel hip flexor stretch w/ femoral IR 2'ea  x x     Hip ER/abd in SL stance w/opp foot on wall 2x15 orange x  Y    Lax ball foot self mob x20        Burrito Gastroc stretch 2'   x x  gastroc and soleus   Squat to chair x30  x x  Chair at knees as a cue   Impact lunge 2x10  x x     skaters x20  x x     Toe yoga x30   x        MOBO         Star squat x15 reps total     In stocking feet for HEP Foot rocks x20    Y    Power stride x20        Powerstride w/HR x5        HR x10    Y    KB pass x10 Purple   Y    Chair squats 20  x x  Added 7/20   Arm circles x30 Blue x      Pull aparts x20 Blue x      Reverse split squats 20  x x  Added 7/20   Monster walks               Hip thrusts x30    x         TM run   (treadmill)    5.0mph    x    5 min walk TRINIDAD, walk cool down   Other:     Specific Instructions for next treatment:  she can run every other day 30 min consecutive    Treatment Charges: Mins Units   []  Modalities     [x]  Ther Exercise 40 3   []  Manual Therapy     []  Ther Activities     []  NMR     []  Vasocompression     Total Treatment time 40 3       Assessment: [x] Progressing toward goals. she had no symtpoms during or immediately after her run. Effort was easy. Reviewed HEP. Strengthening every other day and stretches every day     [] Other:  [x] Patient would continue to benefit from skilled physical therapy services in order to: Increase neuromuscular control and correct ROM block at ankle and foot on the L    STG: (to be met in 6 treatments)  1. ? Pain:< 3/10 post run to allow pt to continue training for half marathon  2. ? ROM: normal hip extension and ankle DF to allow for improved glute activation and LE mechanics  3. ? Strength:5/5 hip strength to correct LE mechanics  4. ? Function: Able to hold corrections to pelvis and participate in video run analysis to decrease loading rates and improve use of glutes for stability  5. UWRI score to 30  6. Independent with Home Exercise Programs     LTG: (to be met in 12 treatments)  1. Pain 0/10 knee pain  2. Able to demonstrate little to no deviation with single leg squat  3. UWRI score to 36  4. Able to run as she wishes with acceptable run mechanics as seen on video analysis                 Patient goals:Figure out how to correct the problem    Pt.  Education:  [x] Yes  [] No  [] Reviewed Prior HEP/Ed  Method of Education: [x] Verbal  [] ZUPT [] Written  Comprehension of Education:  [] Verbalizes understanding. [x] Demonstrates understanding. [] Needs review. [] Demonstrates/verbalizes HEP/Ed previously given. Plan: [x] Continue to work on neuromuscular control and hip strength to correct mechanics.   She is to do 30 min every other day for 1 wk and then increase to 35 min        Time In:0907  Time Out:  1000    Electronically signed by:  Kelsey Runner, PT

## 2020-07-28 ENCOUNTER — HOSPITAL ENCOUNTER (OUTPATIENT)
Dept: PHYSICAL THERAPY | Facility: CLINIC | Age: 45
Setting detail: THERAPIES SERIES
Discharge: HOME | End: 2020-07-28
Payer: COMMERCIAL

## 2020-07-28 PROCEDURE — 97140 MANUAL THERAPY 1/> REGIONS: CPT

## 2020-07-28 PROCEDURE — 97110 THERAPEUTIC EXERCISES: CPT

## 2020-07-28 NOTE — FLOWSHEET NOTE
[] [facility]  [address]  [phone]  F: [phone] [] [address]  [facility] 36   [address]  P: [phone]  F: [phone] [x] [place]  [address] [facility]  P: [phone]  F: [phone] [] [address]  [facility]   [address]   [place]: [phone]  F: [phone]      Physical Therapy Daily Treatment Note    Date:  2020  Patient Name:  Minal Han    :  1975  MRN: [id number]  Physician: Dr. Nathaniel Pina: MMO(30 visits)  Medical Diagnosis: B knee pain, L shin pain    Rehab Codes: M25.561, M25.562, M79.662  Onset date: 2/10/20                            Next Dr's appt.: 8/10/20  Visit# / total visits: 10/12   Cancels/No Shows: 0    Subjective:    Pain:  [] Yes  [x] No Location: B knee Pain Rating: (0-10 scale)0/10  Pain altered Tx:  [] No  [] Yes  Action:  Comments: Pt reports she ran outside for 30 min at 10:30 pace and a abimael of 162 and felt \"an awareness\" after that. No pain.   She remains compliant with HEP    Objective:  Manual:DI to L glut and piriformis at patient request  Precautions: standard  Exercises:  Exercise Reps/ Time Weight/ Level Issued for HEP   MOBO Y/N Comments   Prone               [place] hip ext x30    x         Supine               Posture Bridge marches 2x10    x         Gym               1/2 kneel hip flexor stretch w/ femoral IR 2'ea  x x     Hip ER/abd in SL stance w/opp foot on wall 2x15 orange x  Y    Lax ball foot self mob x20        Burrito Gastroc stretch 2'   x x  gastroc and soleus   Squat to chair x30  x x  Chair at knees as a cue   Impact lunge 2x10  x x     skaters x20  x x     Toe yoga x30   x        MOBO         Star squat x15 reps total     In stocking feet for HEP   Foot

## 2020-07-31 ENCOUNTER — HOSPITAL ENCOUNTER (OUTPATIENT)
Dept: PHYSICAL THERAPY | Facility: CLINIC | Age: 45
Setting detail: THERAPIES SERIES
Discharge: HOME | End: 2020-07-31
Payer: COMMERCIAL

## 2020-07-31 PROCEDURE — 97110 THERAPEUTIC EXERCISES: CPT

## 2020-07-31 PROCEDURE — 97140 MANUAL THERAPY 1/> REGIONS: CPT

## 2020-07-31 NOTE — FLOWSHEET NOTE
[] [address] [address]  [address]  [phone]  F: [phone] [] [address]  [facility] 36   [address]  P: [phone]  F: [phone] [x] [address]  [facility]  [address] [facility]  P: [phone]  F: [phone] [] [address]  [facility]   [address]   [place]: [phone]  F: [phone]      Physical Therapy Daily Treatment Note    Date:  2020  Patient Name:  Monika Zamora    :  1975  MRN: [id number]  Physician: Dr. Lorena Kennedy: RIMAO(30 visits)  Medical Diagnosis: B knee pain, L shin pain    Rehab Codes: M25.561, M25.562, M79.662  Onset date: 2/10/20                            Next Dr's appt.: 8/10/20  Visit# / total visits:    Cancels/No Shows: 0    Subjective:    Pain:  [] Yes  [x] No Location: B knee Pain Rating: (0-10 scale)0/10  Pain altered Tx:  [] No  [] Yes  Action:  Comments: Pt reports she ran outside for 3/3' x 6 cycles and just \"an awareness\"  In L calf     Objective:  Manual:STM to L calf in supine and prone  Precautions: standard  Exercises:  Exercise Reps/ Time Weight/ Level Issued for HEP   MOBO Y/N Comments   Prone               [place] hip ext x30    x         Supine               Posture Bridge marches 2x10    x         Gym               1/2 kneel hip flexor stretch w/ femoral IR 2'ea  x x     Hip ER/abd in SL stance w/opp foot on wall 2x15 orange x  Y    Lax ball foot self mob x20        Burrito Gastroc stretch 2'   x x  gastroc and soleus   Squat to chair x30  x x  Chair at knees as a cue   Impact lunge 2x10  x x     skaters x20  x x     Toe yoga x30   x        MOBO         Star squat x15 reps total     In stocking feet for HEP   Foot rocks x20    Y    Power stride x20        Powerstride w/HR x5        HR x10    Carroll Bloch KB pass x10 Purple   Y    Chair squats 20  x x  Added 7/20   Arm circles x30 Blue x      Pull aparts x20 Blue x      Reverse split squats 20  x x  Added 7/20   Monster walks               Hip thrusts x30    x         TM run   (treadmill) 3/3' on/off  X 6 cycles 5.0mph    x x  5 min walk TRINIDAD, walk cool down  Abimael: 172 spm   Other:     Specific Instructions for next treatment:  she can run every other day 3 min/3 min off up to 6 cycles on a treadmill at 12 min/mile pace rather than outside to prevent too fast of pace. Maintain abimael at 172 spm    Treatment Charges: Mins Units   []  Modalities     [x]  Ther Exercise 40 2   [x]  Manual Therapy 10 1   []  Ther Activities     []  NMR     []  Vasocompression     Total Treatment time 4 3       Assessment: [x] Progressing toward goals. She did have mild to mod tenderness of L calf. Reviewed sagittal plane mechanics and did not display any abberant mechanics    [] Other:  [x] Patient would continue to benefit from skilled physical therapy services in order to: Increase neuromuscular control and correct ROM block at ankle and foot on the L    STG: (to be met in 6 treatments)  1. ? Pain:< 3/10 post run to allow pt to continue training for half marathon  2. ? ROM: normal hip extension and ankle DF to allow for improved glute activation and LE mechanics  3. ? Strength:5/5 hip strength to correct LE mechanics  4. ? Function: Able to hold corrections to pelvis and participate in video run analysis to decrease loading rates and improve use of glutes for stability  5. UWRI score to 30  6. Independent with Home Exercise Programs     LTG: (to be met in 12 treatments)  1. Pain 0/10 knee pain  2. Able to demonstrate little to no deviation with single leg squat  3. UWRI score to 36  4. Able to run as she wishes with acceptable run mechanics as seen on video analysis                 Patient goals:Figure out how to correct the problem    Pt.  Education:  [x] Yes  [] No  [] Reviewed Prior HEP/Ed  Method of Education: [x] Verbal  [] Demo  [] Written  Comprehension of Education:  [] Verbalizes understanding. [x] Demonstrates understanding. [] Needs review. [] Demonstrates/verbalizes HEP/Ed previously given. Plan: [x] Continue to work on neuromuscular control and hip strength to correct mechanics.           Time TI:3546  Time Out:      Electronically signed by:  Eric Reddy PT

## 2020-08-10 ENCOUNTER — HOSPITAL ENCOUNTER (OUTPATIENT)
Dept: PHYSICAL THERAPY | Facility: CLINIC | Age: 45
Setting detail: THERAPIES SERIES
Discharge: HOME | End: 2020-08-10
Payer: COMMERCIAL

## 2020-08-10 NOTE — FLOWSHEET NOTE
[] [address]  [address]  [phone]  F: [phone] [] [address]  [facility] 36   [address]  P: [phone]  F: [phone] [x] [address]  [address] [facility]  P: [phone]  F: [phone] [] [address]  [facility]   [address]   [place]: [phone]  F: [phone]      Physical Therapy Daily Treatment Note    Date:  8/10/2020  Patient Name:  Fernando James    :  1975  MRN: [id number]  Physician: Dr. Zoraida Garvin: RIMAO(30 visits)  Medical Diagnosis: B knee pain, L shin pain    Rehab Codes: M25.561, M25.562, M79.662  Onset date: 2/10/20                            Next Dr's appt.: 8/10/20  Visit# / total visits:    Cancels/No Shows: 0    Subjective:    Pain:  [] Yes  [x] No Location: B knee Pain Rating: (0-10 scale)0/10  Pain altered Tx:  [] No  [] Yes  Action:  Comments: Pt reports she ran outside for 30 min at 10:30 pace and a abimael of 162 and felt \"an awareness\" after that. No pain.   She remains compliant with HEP    Objective:  Manual:DI to L glut and piriformis at patient request  Precautions: standard  Exercises:  Exercise Reps/ Time Weight/ Level Issued for HEP   MOBO Y/N Comments   Prone               [place] hip ext x30    x         Supine               Posture Bridge marches 2x10    x         Gym               1/2 kneel hip flexor stretch w/ femoral IR 2'ea  x x     Hip ER/abd in SL stance w/opp foot on wall 2x15 orange x  Y    Lax ball foot self mob x20        Burrito Gastroc stretch 2'   x x  gastroc and soleus   Squat to chair x30  x x  Chair at knees as a cue   Impact lunge 2x10  x x     skaters x20  x x     Toe yoga x30   x        MOBO         Star squat x15 reps total     In stocking feet for HEP   Foot demonstrate little to no deviation with single leg squat  3. UWRI score to 36  4. Able to run as she wishes with acceptable run mechanics as seen on video analysis                 Patient goals:Figure out how to correct the problem    Pt. Education:  [x] Yes  [] No  [] Reviewed Prior HEP/Ed  Method of Education: [x] Verbal  [] Demo  [] Written  Comprehension of Education:  [] Verbalizes understanding. [x] Demonstrates understanding. [] Needs review. [] Demonstrates/verbalizes HEP/Ed previously given. Plan: [x] Continue to work on neuromuscular control and hip strength to correct mechanics.           Time In:  Time Out:      Electronically signed by:  Mica Amezcua, PT

## 2020-08-10 NOTE — FLOWSHEET NOTE
[] [address]  [address]    [phone]  F: [phone]   [] [address] [facility] 36   [address]  P: [phone]  F: [phone]  [] [address]  [address] [facility]  P: [phone]  F: [phone]  [] [address]  [facility]   [address]   [place]: [phone]  F: [phone]   [] [address]  [place]: [phone]   F: [phone]     Physical Therapy Cancel/No Show note    Date: 8/10/2020  Patient: Yash Stanley  : 1975  MRN: [id number]    Cancels/No Shows to date:     For today's appointment patient:    [x]  Cancelled    [] Rescheduled appointment    [] No-show     Reason given by patient:    []  Patient ill    []  Conflicting appointment    [] No transportation      [x] Conflict with work    [] No reason given    [] Weather related    [] COVID-19    [] Other:      Comments:  Pt will call back to r/s    [] Next appointment was confirmed    Electronically signed by: Naveed Medina

## 2020-09-08 ENCOUNTER — HOSPITAL ENCOUNTER (OUTPATIENT)
Dept: PHYSICAL THERAPY | Facility: CLINIC | Age: 45
Setting detail: THERAPIES SERIES
Discharge: HOME | End: 2020-09-08
Payer: COMMERCIAL

## 2020-09-08 PROCEDURE — 97140 MANUAL THERAPY 1/> REGIONS: CPT

## 2020-09-08 PROCEDURE — 97112 NEUROMUSCULAR REEDUCATION: CPT

## 2020-09-08 PROCEDURE — 97110 THERAPEUTIC EXERCISES: CPT

## 2020-09-08 NOTE — FLOWSHEET NOTE
[] [address]  [address]  [phone]  F: [phone] [] [address]  [facility]   [address]  P: [phone]  F: [phone] [x] [place]  [address]  P: [phone]  F: [phone] [] [address]  [facility]   [address]   [place]: [phone]  F: [phone]      Physical Therapy Daily Treatment Note    Date:  2020  Patient Name:  Shaji Mccoy    :  1975  MRN: [id number]  Physician: Dr. Fields Cap: MMO(30 visits)  Medical Diagnosis: B knee pain, L shin pain    Rehab Codes: M25.561, M25.562, M79.662  Onset date: 2/10/20                            Next 's appt.: 8/10/20  Visit# / total visits:    Cancels/No Shows: 0    Subjective:    Pain:  [] Yes  [x] No Location: B knee Pain Rating: (0-10 scale)0/10  Pain altered Tx:  [] No  [] Yes  Action:  Comments: Pt reports she listened and did what she was supposed to do for abimael on the treadmill. Was doing every other day running and walking on off days. Tried to transition from treadmill to outside and went from 177spm to 168spm and started to feel pain again at L shin. Stopped running and was just biking over the last week.      Objective:  Manual:DI to L glut med, piriformis and hip flexor, hypervolt to medial gastroc and posterior tib  Precautions: standard  Exercises:  Exercise Reps/ Time Weight/ Level Issued for HEP   MOBO Y/N Comments   Prone               Flying squirrel 2x10    x  x       Supine               Posture Bridge marches 2x10    x         Gym               1/2 kneel hip flexor stretch w/ femoral IR 2'ea  x x     Hip ER/abd in SL stance w/opp foot on wall 2x15 orange   Y    Lax ball foot self mob x20        Step calf  stretch 2'   x x gastroc and soleus   Squat to chair x30     Chair at knees as a cue   Impact lunge 2x10        skaters x20        Toe yoga x30           1/2 side plank with hip abduction 2x10  x x     Curtsy deadlift 2x10 10# x x     MOBO         Star squat x15 reps total     In stocking feet for HEP   Foot rocks x20    Y    Power stride x20        Powerstride w/HR x5        HR x10    Y    KB pass x10 Purple   Y    Chair squats 20  x   Added 7/20   Arm circles x30 Blue x      Pull aparts x20 Blue x      Reverse split squats 20  x   Added 7/20   Monster walks  2 laps  blue    x       Hip thrusts x30    x         TM run   (treadmill) 8' after [place]IN[place]   10:54min/mile    x x  3 min walk TR[place]  Abimael: 174 spm   Other:     Specific Instructions for next treatment:      Treatment Charges: Mins Units   []  Modalities     [x]  Ther Exercise 20 1   [x]  Manual Therapy 20 1   []  Ther Activities     [x]  NMR 8 1   []  Vasocompression     Total Treatment time 48 3       Assessment: [x] Progressing toward goals. Pt is having difficulty transitioning from TM to outside without dropping abimael. Put pt on TM at pace she states she was going outside with a abimael of 168spm that she stated she went. Pt was nearly straight at the knee with high foot to ground angle and only 1/2 as much knee flex as desired in swing. When increased to 174 spm pt corrected form flaws seen and could feel a difference in how hard she was coming down at IC and how much easier it felt to run at the higher abimael in terms of impact at IC. She could feel it aerobically though so she is cautioned not to get too quick outside or if she is going to go at that pace she must do a run/walk until she builds aerobic fitness. [] Other:  [x] Patient would continue to benefit from skilled physical therapy services in order to:  Increase neuromuscular control and correct ROM block at ankle and foot on the L    STG: (to be met in 6 treatments)  1. ? Pain:< 3/10 post run to allow pt to continue training for half marathon  2. ? ROM: normal hip extension and ankle DF to allow for improved glute activation and LE mechanics  3. ? Strength:5/5 hip strength to correct LE mechanics  4. ? Function: Able to hold corrections to pelvis and participate in video run analysis to decrease loading rates and improve use of glutes for stability  5. UWRI score to 30  6. Independent with Home Exercise Programs     LTG: (to be met in 12 treatments)  1. Pain 0/10 knee pain  2. Able to demonstrate little to no deviation with single leg squat  3. UWRI score to 36  4. Able to run as she wishes with acceptable run mechanics as seen on video analysis                 Patient goals:Figure out how to correct the problem    Pt. Education:  [x] Yes  [] No  [] Reviewed Prior HEP/Ed  Method of Education: [x] Verbal  [] Demo  [] Written  Comprehension of Education:  [] Verbalizes understanding. [x] Demonstrates understanding. [] Needs review. [] Demonstrates/verbalizes HEP/Ed previously given. Plan: [x] Continue to work on neuromuscular control and hip strength to correct mechanics.           Time In:4122  Time Out:  9664    Electronically signed by:  Valeriano Pennington, PT

## 2020-09-15 ENCOUNTER — APPOINTMENT (OUTPATIENT)
Dept: PHYSICAL THERAPY | Facility: CLINIC | Age: 45
End: 2020-09-15
Payer: COMMERCIAL

## 2020-09-22 ENCOUNTER — HOSPITAL ENCOUNTER (OUTPATIENT)
Dept: PHYSICAL THERAPY | Facility: CLINIC | Age: 45
Setting detail: THERAPIES SERIES
Discharge: HOME | End: 2020-09-22
Payer: COMMERCIAL

## 2020-09-22 PROCEDURE — 97112 NEUROMUSCULAR REEDUCATION: CPT

## 2020-09-22 NOTE — FLOWSHEET NOTE
[] [facility]) - [facility]  [address]  [phone]  F: [phone] [] [address]  [place] [address]   [address]  P: [phone]  F: [phone] [x] [address]  Therapy  [address]  P: [phone]  F: [phone] [] [address]  [facility]   [address]   [place]: [phone]  F: [phone]      Physical Therapy Daily Treatment Note    Date:  2020  Patient Name:  Louis Murillo    :  1975  MRN: [id number]  Physician: Dr. Anahi Silva: MMO(30 visits)  Medical Diagnosis: B knee pain, L shin pain    Rehab Codes: M25.561, M25.562, M79.662  Onset date: 2/10/20                            Next 's appt.: 8/10/20  Visit# / total visits:    Cancels/No Shows: 0    Subjective:    Pain:  [] Yes  [x] No Location: B knee Pain Rating: (0-10 scale)0/10  Pain altered Tx:  [] No  [] Yes  Action:  Comments: Pt reports she is at 3 miles 170spm outside, Treadmill stayed 174spm.     Objective:  Manual:prn  Precautions: standard  Exercises:  Exercise Reps/ Time Weight/ Level Issued for HEP   MOBO Y/N Comments   Prone               Flying squirrel 2x10    x         Supine               Posture Bridge marches 2x10    x         Gym               1/2 kneel hip flexor stretch w/ femoral IR 2'ea  x      Hip ER/abd in SL stance w/opp foot on wall 2x15 orange   Y    Lax ball foot self mob x20        Step calf  stretch 2'   x   gastroc and soleus   Squat to chair x30     Chair at knees as a cue   Impact lunge 2x10        skaters x20        Toe yoga x30           1/2 side plank with hip abduction 2x10  x      Curtsy deadlift 2x10 10# x      MOBO         Star squat x15 reps total     In stocking feet for HEP   Foot rocks x20    Y    Power stride x20        Powerstride w/HR x5        HR x10    Y    KB pass x10 Purple   Y    Chair squats 20  x   Added 7/20   Arm circles x30 Blue x      Pull aparts x20 Blue x      Reverse split squats 20  x   Added 7/20   Monster walks  2 laps  blue           Hip thrusts x30    x         TM run   (treadmill) 35'   11:19-12:00min/mile    x x    Abimael: 172 spm       Treatment Charges: Mins Units   []  Modalities     []  Ther Exercise     []  Manual Therapy     []  Ther Activities     [x]  NMR 30 2   []  Vasocompression     Total Treatment time 30 2       Assessment: [x] Progressing toward goals. Pt retested. Full ankle DF, full hip extension ROM. Pt is testing 5/5 hip strength. Normal tone of calves with only 1 small TP at R medial gastroc. Had pt run on TM SHe is able to keep abimael with good posture and lower leg // to ground in swing. As she continues at her self selected pace on the treadmill she begins to get aerobically taxed and starts to tighten up at shoulders with them elevated. This leads to difficulty maintaining abimael and lower leg begins to lack flexion causing the leg to swing further in front of her. Pt educated on finding the pace that does not get her aerobically taxed at the 172-174spm abimael and focus on making that her new normal for run form and worry about speed later. Pt voices understanding. She was given instruction on how to progress mileage. STG: (to be met in 6 treatments)  1. ? Pain:< 3/10 post run to allow pt to continue training for half marathon  2. ? ROM: normal hip extension and ankle DF to allow for improved glute activation and LE mechanics  3. ? Strength:5/5 hip strength to correct LE mechanics  4. ? Function: Able to hold corrections to pelvis and participate in video run analysis to decrease loading rates and improve use of glutes for stability  5. UWRI score to 30  6. Independent with Home Exercise Programs     LTG: (to be met in 12 treatments)  1. Pain 0/10 knee pain  2.  Able to demonstrate little to no deviation with single leg squat  3. UWRI score to 36  4. Able to run as she wishes with acceptable run mechanics as seen on video analysis                 Patient goals:Figure out how to correct the problem    Pt. Education:  [x] Yes  [] No  [] Reviewed Prior HEP/Ed  Method of Education: [x] Verbal  [] Demo  [] Written  Comprehension of Education:  [] Verbalizes understanding. [x] Demonstrates understanding. [] Needs review. [] Demonstrates/verbalizes HEP/Ed previously given. Plan: At this point patient is happy with her progress and knows what she needs to do to continue to progress on her own.  DC from PT at this time to HEP          Time In:1635  Time Out:  1730    Electronically signed by:  Morgan Lang PT

## 2021-06-16 ENCOUNTER — OFFICE VISIT (OUTPATIENT)
Dept: ORTHOPEDIC SURGERY | Age: 46
End: 2021-06-16
Payer: COMMERCIAL

## 2021-06-16 VITALS
WEIGHT: 170 LBS | HEIGHT: 70 IN | DIASTOLIC BLOOD PRESSURE: 74 MMHG | HEART RATE: 46 BPM | SYSTOLIC BLOOD PRESSURE: 124 MMHG | BODY MASS INDEX: 24.34 KG/M2

## 2021-06-16 DIAGNOSIS — S86.812A STRAIN OF LEFT CALF MUSCLE: ICD-10-CM

## 2021-06-16 PROCEDURE — 99203 OFFICE O/P NEW LOW 30 MIN: CPT | Performed by: FAMILY MEDICINE

## 2021-06-16 PROCEDURE — G8420 CALC BMI NORM PARAMETERS: HCPCS | Performed by: FAMILY MEDICINE

## 2021-06-16 PROCEDURE — 1036F TOBACCO NON-USER: CPT | Performed by: FAMILY MEDICINE

## 2021-06-16 PROCEDURE — G8427 DOCREV CUR MEDS BY ELIG CLIN: HCPCS | Performed by: FAMILY MEDICINE

## 2021-06-16 NOTE — PROGRESS NOTES
Exercise per Session:    Stress:     Feeling of Stress :    Social Connections:     Frequency of Communication with Friends and Family:     Frequency of Social Gatherings with Friends and Family:     Attends Gnosticism Services:     Active Member of Clubs or Organizations:     Attends Club or Organization Meetings:     Marital Status:    Intimate Partner Violence:     Fear of Current or Ex-Partner:     Emotionally Abused:     Physically Abused:     Sexually Abused:        No current outpatient medications on file. No current facility-administered medications for this visit. Allergies:  shehas No Known Allergies. ROS:  CV:  Denies chest pain; palpitations; shortness of breath; swelling of feet, ankles; and loss of consciousness. CON: Denies fever and dizziness. ENT:  Denies hearing loss / ringing, ear infections hoarseness, and swallowing problems. RESP:  Denies chronic cough, spitting up blood, and asthma/wheezing. GI: Denies abdominal pain, change in bowel habits, nausea or vomiting, and blood in stools. :  Denies frequent urination, burning or painful urination, blood in the urine, and bladder incontinence. NEURO:  Denies headache, memory loss, sleep disturbance, and tremor or movement disorder. PHYSICAL EXAM:   /74 (Site: Left Upper Arm)   Pulse (!) 46   Ht 5' 10\" (1.778 m)   Wt 170 lb (77.1 kg)   BMI 24.39 kg/m²   GENERAL: Sadia Rashid is a 39 y.o. female who is alert and oriented and sitting comfortably in our office. SKIN:  Intact without rashes, lesions or ulcerations. NEURO: Sensation to the extremity is intact. VASC:  Capillary refill is less than 3 seconds. There is no lymphadenopathy. Knee Exam  Musculoskeletal/Neurologic:  Inspection-Swelling: none,   Palpation-Tenderness:no knee ttp  ROM- 0-135  Strength- WNL    Tibia/Fibula exam  Tenderness mostly gastroc over soleus  The patient is  able to single toe raise.   Single leg hop test: negative    Ankle Exam:    Reveals there is not effusion. Swelling is not present. Edema is not not present. Ecchymoses is not present. Palpation-Tenderness no ankle ttp  The foot is in functional planus alignment. ROM:  40 degrees plantarflexion and 20 degrees dorsiflexion. Subtalar motion is 30 degrees inversion and 20 degrees eversion. Strength-WNL  Neg homans    Gait: Normal    PSYCH:  Patient has good fund of knowledge and displays understanding of exam.    RADIOLOGY: No results found. IMPRESSION:     1. Strain of left calf muscle        PLAN:   We discussed some of the etiologies and natural histories of     ICD-10-CM    1. Strain of left calf muscle  S86.812A     We discussed the various treatment alternatives including anti-inflammatory medications, physical therapy, injections, further imaging studies and as a last resort surgery. At this point patient is getting better where it is still treat her conservatively with home exercises and if she fails to improve over the next 2 weeks we will have her seek formal physical therapy with one of our running physical therapist and her follow-up with me will be otherwise as needed. She voiced understanding and agreement with this plan    No follow-ups on file.     Please be aware portions of this note were completed using voice recognition software and unforeseen errors may have occurred    Electronically signed by Keyur Molina DO, FAOASM  on 6/16/21 at 10:40 AM EDT

## 2021-11-04 DIAGNOSIS — M79.672 LEFT FOOT PAIN: Primary | ICD-10-CM

## 2021-11-08 ENCOUNTER — OFFICE VISIT (OUTPATIENT)
Dept: ORTHOPEDIC SURGERY | Age: 46
End: 2021-11-08
Payer: COMMERCIAL

## 2021-11-08 VITALS — HEART RATE: 69 BPM | SYSTOLIC BLOOD PRESSURE: 136 MMHG | DIASTOLIC BLOOD PRESSURE: 81 MMHG

## 2021-11-08 DIAGNOSIS — M72.2 PLANTAR FASCIITIS OF LEFT FOOT: ICD-10-CM

## 2021-11-08 DIAGNOSIS — M76.62 LEFT ACHILLES TENDINITIS: Primary | ICD-10-CM

## 2021-11-08 DIAGNOSIS — M24.552 LEFT HIP FLEXOR TIGHTNESS: ICD-10-CM

## 2021-11-08 PROCEDURE — G8484 FLU IMMUNIZE NO ADMIN: HCPCS | Performed by: FAMILY MEDICINE

## 2021-11-08 PROCEDURE — 1036F TOBACCO NON-USER: CPT | Performed by: FAMILY MEDICINE

## 2021-11-08 PROCEDURE — G8427 DOCREV CUR MEDS BY ELIG CLIN: HCPCS | Performed by: FAMILY MEDICINE

## 2021-11-08 PROCEDURE — 99213 OFFICE O/P EST LOW 20 MIN: CPT | Performed by: FAMILY MEDICINE

## 2021-11-08 PROCEDURE — G8420 CALC BMI NORM PARAMETERS: HCPCS | Performed by: FAMILY MEDICINE

## 2021-11-08 NOTE — PROGRESS NOTES
Sports Medicine Consultation      CHIEF COMPLAINT:  Foot Pain (Left. 3-4 wks. no injury. possible overuse. lateral dorsal forefoot) and Ankle Pain (Left. 6m. no injury. sharp pain upon waking in morning)  . HPI:   The patient is a 39 y.o. female who is being seen in   established patient being seen for regarding new problem  left foot/ankle pain. The patient states the pain has been present for 3-4 weeks. As far as trauma to the area, the patient indicates running pain. There is not pain with weight bearing but she had some first step pain in the achilles. The patient states numbness and tingling is not present. Catching and locking has not been present. She has tried rest with relief for the heel pain. she has no past medical history on file. she has no past surgical history on file. family history is not on file. Social History     Socioeconomic History    Marital status:      Spouse name: Not on file    Number of children: Not on file    Years of education: Not on file    Highest education level: Not on file   Occupational History    Not on file   Tobacco Use    Smoking status: Never Smoker    Smokeless tobacco: Never Used   Substance and Sexual Activity    Alcohol use: Yes     Comment: socially    Drug use: Never    Sexual activity: Not on file   Other Topics Concern    Not on file   Social History Narrative    Not on file     Social Determinants of Health     Financial Resource Strain:     Difficulty of Paying Living Expenses: Not on file   Food Insecurity:     Worried About Running Out of Food in the Last Year: Not on file    Kristian of Food in the Last Year: Not on file   Transportation Needs:     Lack of Transportation (Medical): Not on file    Lack of Transportation (Non-Medical):  Not on file   Physical Activity:     Days of Exercise per Week: Not on file    Minutes of Exercise per Session: Not on file   Stress:     Feeling of Stress : Not on file   Social Connections:     Frequency of Communication with Friends and Family: Not on file    Frequency of Social Gatherings with Friends and Family: Not on file    Attends Anabaptism Services: Not on file    Active Member of Clubs or Organizations: Not on file    Attends Club or Organization Meetings: Not on file    Marital Status: Not on file   Intimate Partner Violence:     Fear of Current or Ex-Partner: Not on file    Emotionally Abused: Not on file    Physically Abused: Not on file    Sexually Abused: Not on file   Housing Stability:     Unable to Pay for Housing in the Last Year: Not on file    Number of [place] in the Last Year: Not on file    Unstable Housing in the Last Year: Not on file       No current outpatient medications on file. No current facility-administered medications for this visit. Allergies:  shehas No Known Allergies. ROS:  CV:  Denies chest pain; palpitations; shortness of breath; swelling of feet, ankles; and loss of consciousness. CON: Denies fever and dizziness. ENT:  Denies hearing loss / ringing, ear infections hoarseness, and swallowing problems. RESP:  Denies chronic cough, spitting up blood, and asthma/wheezing. GI: Denies abdominal pain, change in bowel habits, nausea or vomiting, and blood in stools. :  Denies frequent urination, burning or painful urination, blood in the urine, and bladder incontinence. NEURO:  Denies headache, memory loss, sleep disturbance, and tremor or movement disorder. 11 system review of systems is otherwise negative unless noted in HPI    PHYSICAL EXAM:   /81   Pulse 69   GENERAL: Raymond Monterroso is a 39 y.o. female who is alert and oriented and sitting comfortably in our office. SKIN:  Intact without rashes, lesions or ulcerations. No obvious deformity or swelling. NEURO: Musculoskeletal and axillary nerves intact to sensory and motor testing. EYES:  Extraocular muscles intact.   MOUTH: Oral mucosa moist.  No perioral lesions. PULM:  Respirations unlabored and regular. VASC:  Capillary refill less than 3 seconds. Distal pulses are palpable. There is no lymphadenopathy. Ankle Exam:    Reveals there is not effusion. Swelling is not present. Edema is not present. Ecchymoses is not present. Palpation-Tenderness achilles mild, hip flexor left, plantar fascia distally  The foot is in normal alignment. ROM:  40 degrees plantarflexion and 20 degrees dorsiflexion. Subtalar motion is 30 degrees inversion and 20 degrees eversion. Strength-WNL  Sensation-normal to light touch  Special Tests-Ankle inversion: laxity negative  Ankle eversion: laxity negative  Ankle drawer: laxity negative  External rotation:negative  Syndesmotic Squeeze test: negative  The patient is  able to single toe raise. Single leg hop test: negative  Gait: Short-leg    PSYCH:  Patient has good fund of knowledge and displays understanding of exam.    RADIOLOGY: No results found. 3 views of the left foot and ankle were ordered, independently visualized by me, and discussed with patient. Findings: Radiographs of both the left foot and ankle failed to demonstrate any significant osseous abnormalities there is a plantar fascial spur and Achilles spur noted on plain film radiograph without acute fracture or dislocation on either entity    Impression: Acute osseous abnormalities of both the left foot and left ankle      IMPRESSION:     1. Left Achilles tendinitis    2. Left hip flexor tightness    3. Plantar fasciitis of left foot        PLAN:   We discussed some of the etiologies and natural histories of     ICD-10-CM    1. Left Achilles tendinitis  M76.62 XR ANKLE LEFT (MIN 3 VIEWS)     Ambulatory referral to Physical Therapy   2. Left hip flexor tightness  M24.552 Ambulatory referral to Physical Therapy   3.  Plantar fasciitis of left foot  M72.2 Ambulatory referral to Physical Therapy    We discussed the various treatment alternatives including anti-inflammatory medications, physical therapy, injections, further imaging studies and as a last resort surgery. At this point patient has a complicated running issue stemming from hip flexor tightness this time I do think that she needs a course of formal physical therapy to address both the hip flexor tightness and subsequent plantar fascial issues we will treat her conservatively with course of physical therapy for all of these issues    No follow-ups on file. Please be aware portions of this note were completed using voice recognition software and unforeseen errors may have occurred    Electronically signed by Marquis Malathi DO, FAOASM  on 11/8/21 at 3:27 PM EST    No orders of the defined types were placed in this encounter.

## 2021-11-15 ENCOUNTER — HOSPITAL ENCOUNTER (OUTPATIENT)
Dept: PHYSICAL THERAPY | Facility: CLINIC | Age: 46
Setting detail: THERAPIES SERIES
Discharge: HOME | End: 2021-11-15
Payer: COMMERCIAL

## 2021-11-15 PROCEDURE — 97110 THERAPEUTIC EXERCISES: CPT

## 2021-11-15 PROCEDURE — 97140 MANUAL THERAPY 1/> REGIONS: CPT

## 2021-11-15 PROCEDURE — 97161 PT EVAL LOW COMPLEX 20 MIN: CPT

## 2021-11-15 NOTE — CONSULTS
[] [facility]) - [facility] TWELVESTEP [facility] &  Therapy  [address]  [phone]  F: [phone] [] [address]  [address]   [address]  P: [phone]  F: [phone] [x] [address] &  Therapy  [address]  P: [phone]  F: [phone] [] [address]  P: [phone]  F: [phone] [] [address]  [facility]   [address]   [place]: [phone]  F: [phone]      Physical Therapy Lower Extremity Evaluation    Date: 11/15/2021  Patient: Sabrina Galvez : 1975 Gender: Female MRN: [id number]  Physician: Dr. Laci Phan: [facility] [place]   Medical Diagnosis:   L achilles tendonitis, L plantar fasciitis, L hip flexor tightness   Rehab Codes: M76.62,  M24.552, M72.2  Onset Date:  10/18/21 Next 's Appt: prn   Visits:   Cancels/No shows: 0/0    SUBJECTIVE  CC: Upon arrival patient stated she is not in pain in the L achilles tendon or L plantar aspect, stated she ran 3 miles this morning with no pain. Pt stated that the pain began 3-4 weeks ago after running and she took a break from running and the pain subsided. Pt stated she started experiencing pain in the medial arch of foot as well as the outside of the foot when she resumed running which would occur afterwards when running was completed. Patient stated first thing in the AM she has pain and tightness in the achilles tendon which subsides after 3-4 steps/ activity. She is running about 25 miles per week currently, is not training and has recently switched shoes; Ghost 12's with inserts to [employer] without inserts due to RADHIKA nails falling off after completing 1/2 marathon.      Onset Date: Onset Mechanism: Running  Onset Type: Gradual    Pain Location: L foot/ L heel  Pain Description:  Intermittent dull and ache pain   Pain Rating Scale (0-10):  Now:  0/10  Worst: 2-3/10     Best: 0/10    Radiation: N[x] Y[] Location:   Loss of sensation: N[x] Y[] Location:    Symptoms: [x] Improving [] Worsening [] Same  Pain Pattern: Better []AM [x]PM  Worse [x]AM []PM  Aggravating Factors: [] Sit [] Sit-to-Stand  []Stand  [] Walk [] Stairs   [x] Other: Running + elliptical (outside of foot)  Relieving Factors: [x] Rest [] Sit [] Other:  Sleep: [x] OK    [] Disturbed:   Overall Health: [x]Excellent []Good [] Fair []Poor  Comments:     Past Medical History: [x] Refer to full medical chart In [facility]   [] Unremarkable [] Diabetes [] HTN [] Pacemaker [] MI/Heart Problems   [] Cancer [] Arthritis [] Other:    Comorbidities: [x] Refer to full medical chart In [facility]   [] Obesity [] Stroke [] vascular disease  [] Dialysis [] Dementia    [] Asthma/COPD [] Sleep Apnea [] Other:    Medications:  [x] Refer to full medical chart In EPIC  [] None []Other:   Allergies:   [x] Refer to full medical chart In EPIC   [] None []Other:    Tests:  [x]X-Ray  []MRI  []Other   (Per Dr. Genesis Clifford Note): 3 views of the left foot and ankle were ordered, independently visualized by me, and discussed with patient.   Findings: Radiographs of both the left foot and ankle failed to demonstrate any significant osseous abnormalities there is a plantar fascial spur and Achilles spur noted on plain film radiograph without acute fracture or dislocation on either entity  Impression: Acute osseous abnormalities of both the left foot and left ankle    Employer: [employer]  Occupation: School Improvement Consultant  Job Status: Full time    ADL/ IADL PLOF  Current level Who assists patient with task   Bathing [x]Independent   []Assistance  [x]Independent   []Assistance    Dressing/ Grooming [x]Independent   []Assistance [x]Independent   []Assistance    Toileting [x]Independent   []Assistance [x]Independent   []Assistance    Transfers/ Mobility   [x]Independent   []Assistance [x]Independent   []Assistance    Eating/ Drinking  [x]Independent   []Assistance [x]Independent   []Assistance    Driving  [x]Independent   []Assistance [x]Independent   []Assistance    Housekeeping  [x]Independent   []Assistance [x]Independent   []Assistance    Grocery Shop/ meal prep  [x]Independent   []Assistance [x]Independent   []Assistance    Gait  [x]Independent   []Assistance [x]Independent   []Assistance      Device  [x]Independent   []Straight cane  []Quad Cane  []Standard Walker []FWW  []Rolling Walker  []Wheelchair    [x]Independent   []Straight cane  []Quad Cane  []Standard Walker []FWW  []Rolling Walker  []Wheelchair      FUNCTION Normal Difficult Unable Comments   Sitting [x] [] []    Standing [x] [] []    Ambulation [x] [] []    Stair Navigation  [x] [] []    Groom/Dress [x] [] []    Lift/Carry Objects  [x] [] []    Bending  [x] [] []    Squat [x] [] []    Kneeling  [x] [] []    Jog/Running [x] [] [] Pt stated having pain afterwards but no difficultly completing task     Pain: [x]N []Y  Location:  L achilles tendon and L plantar fascia   Pain Rating Now (0-10):   0/10    OBEJECTIVE   Observation/Posture NOT Impaired Impaired  Comments   Forward Head     [x]     []    Rounded Shoulders     [x]     []    Kyphosis     [x]     []    Lateral Shift      [x]     []    Iliac Crests Level, ASIS, PSIS, Pelvic Tilt      [x]     []    Genu Valgus     [x]     []    Genu Varus      [x]     []    Genu Recurvatum      [x]     []    Patella Position      [x]     []    Foot/Toe Position:     [x]     [] Neutral foot and toe alignment       Medial Longitudinal Arch      [x]     [] Moderate MLA RADHIKA    Palpation    Tibial Tuberosity   Fibular Head     [x]     []    Medial Malleolus      [x]     []    Lateral Malleolus      [x]     []    Talus      [x]     []    Sustentaculum Arti      [x]     []    Navicular Tuberosity      [x]     []    Calcaneus      [x]     []    5th ray tuberosity      [x]     []    Cuboid Bone      [x]     []     Cuneiforms      [x]     [] Medial, intermediate, lateral    1-5 metatarsals      [x]     []    1-5 toes      [x]     []    Achilles Tendon      []     [x] Tenderness and pain elicited with palpation on L   Plantar Fascia     []     [x] Tenderness and pain elicited with palpation on L    Anterior Tibialis   + ED + EHL      [x]     []    Posterior Tibialis      []     [x] Tenderness and pain elicited with palpation on L    Flexor Digitorum, FHL + FHB     []     [x] Tenderness and pain elicited with palpation on L    Gastroc/ Soleus      []     [x] Tenderness and pain elicited with palpation on L; medial > Lateral    Ligaments      [x]     []    Edema     [x]     []    Sensation     [x]     []    Gait      []     [] Not Tested today        JOINT  CLEARING Cleared  Impaired Comments        Hip      [x]     []    Knee     [x]     []      Clearing Details: No elicit of pain with AROM and MMT.                     ROM (A/P)   End Feel    Strength (MMT)    Right Left     Right  Left    Ankle Dorsiflexion [facility]    Anterior Tibialis  5/5 5/5   Plantarflexion  [facility]   Gastroc/ Soleus  5/5 5/5   Eversion  WFL [facility]   Fibularis Longs/ Brevis  5/5 5/5   Inversion  WFL [employer]   Posterior Tibialis   5/5 5/5   1st MCP Ext WFL [employer]   Extensor Hallicus Longus + Brevis      1st MCP Flex      FHL + FHB      2-5 digits Ext      Ext Digitroum Longus      2-5 digits Flex      Flexor Digitroum Longus       Special Tests (+/-)    Right  Left  Not Tested  Comments   Figure-8    [x]    Heel Thump    [x]    Squeeze Test   [x]     Clunk Test    [x]    Posterior Drawer    [x]    DF lunge Test + Compression   [x]    Anterior Drawer    [x]    Kleiger Test    [x]      Talar & Calcaneal Tilt Tests   [x]    Windlass Test  - - [] NWB and WB    Brizuela squeeze Test  - - []    Matles Test  - - []    Tinel's Sign    [x]      Functional Tests  Deficit  No Deficit Not Tested  Comments   Squat  [] [x] []    SLS Squat  [] [x] []    Forward Lunge  [] [x] []        Functional Test: [facility] Running Index [place])  Score: 20% functionally impaired       ASSESSMENT   Patient presented with no pain upon arrival with walking/ activity. Patient displayed no limitation in ROM or strength RADHIKA, however pt displayed LLE tenderness and elicited pain with palpation of the following structures in comparison to R LE; Achilles Tendon, Plantar Fascia, Posterior tibialis, Flexor Digitorum longus and Flexor Hallicus Longus, and Gastroc/ Soleus (Medial> lateral). Achilles tendon is intact as well as (-) windlass test and functional tests RADHIKA as pt did not have pain or faulty mechanics. Manual was provided to address tightness and improve pliability; pt response was well tolerated and successful. Pt completed calf stretch followed by eccentric heel raises t further improve pliability and initiate functional rehabilitation as well as issued with HEP and discussion of plan. Pt verbalized and demonstrated understanding. Patient would benefit from skilled physical therapy services in order to: Improve muscle/ fascia pliability and decrease pain/ tenderness of LLE structures to improve functional mobility and running. Problems  [x] ? Pain: LLE structures (above)    [x] ? Tenderness: LLE structures (above)      GOALS  Short Term Goals (To be met in 4 treatments)    1. ? Pain: Patient will display no pain with palpation to LLE structures (above) to facilitate improved functional mobility, running, and prevent prevent further injuries. 2. ? Tenderness: Patient will display no tenderness assessed by palpation to LLE structures (above) to facilitate improved functional mobility, running, and prevent prevent further injuries.       3. ? Function: Patient will display proper running mechanics to ensure no display of faults with activity to prevent further risk for injuries assessed by running analysis. 4. Patient to be independent with home exercise program as demonstrated by performance with correct form without cues. Long Term Goals (To be met in 8 treatments)  1. Patient will display and increase in score on UWRI from 29/36 to 34/36 or greater to show improvement in running with association of pain/ injury. Patient Goals: To improve running mechanics and maintain abimael. Rehab Potential:  [x] Good  [] Fair  [] Poor   Suggested Professional Referral:  [x] No  [] Yes:  Barriers to Goal Achievement:  [x] No  [] Yes:  Domestic Concerns:  [x] No  [] Yes:    Pt. Education:  [x] Plans/Goals, Risks/Benefits discussed  [x] Home exercise program    Method of Education: [x] Verbal  [x] Demo  [x] Written   Access Code: [email or web address]  URL: [email or web address]  Date: 11/15/2021  Prepared by: LUCINDA Grewal  Exercises  Soleus Stretch on Wall - 2 x daily - 7 x weekly - 4 sets - 30s hold  Eccentric Heel Lowering on Step - 2 x daily - 7 x weekly - 3 sets - 10 reps - 6s hold  Comprehension of Education:  [x] Verbalizes understanding. [x] Demonstrates understanding. [] Needs Review. [] Demonstrates/verbalizes understanding of HEP/Ed previously given.     PLAN OF CARE   Frequency:  1 x/week for 8 visits    Treatment Plan:  [x] Therapeutic Exercise   34725  [] Iontophoresis: 4 mg/mL Dexamethasone Sodium Phosphate  mAmin  26517   [x] Therapeutic Activity  82911 [x] Vasopneumatic cold with compression  38710    [] Gait Training   44373 [] Ultrasound   74320   [x] Neuromuscular Re-education  38354 [] Electrical Stimulation Unattended  04820   [x] Manual Therapy  06130 [] Electrical Stimulation Attended  29098   [x] Instruction in HEP  [] Lumbar/Cervical Traction  67079   [] Aquatic Therapy   72816 [x] Cold/hotpack    [x] Massage   95929      [] Dry Needling, 1 or 2 muscles  22803   [] Biofeedback, first 15 minutes   43573  [] Biofeedback, additional 15 minutes   62915 [] Dry Needling, 3 or more muscles  17115       TREATMENT    Manual:  DI and hawkgrip to L plantar fascia, DI to L gastroc, Hypervolt to L gastroc/ soleus, Achilles tendon  Modalities: prn  Precautions: N/A    Exercises  Exercise    Sets/Reps/ time Resistance Completed today  Issued HEP Comments    Burrito Stretch  3x30\"   X X L only    Eccentric heel raises  2x10 x6 sec   X X 2 up/ 1 down                                                      Other:   Specific Instructions for next treatment session: Provide Manual as needed, progress dynamic strengthening exercises to LLE, complete running analysis     Evaluation Complexity:  History (Personal factors, comorbidities) [x] 0 [] 1-2 [] 3+   Exam (limitations, restrictions) [x] 1-2 [] 3 [] 4+   Clinical presentation (progression) [x] Stable [] Evolving  [] Unstable   Decision Making [x] Low [] Moderate [] High    [x] Low Complexity [] Moderate Complexity [] High Complexity       Treatment Charges  Minutes  Units  Duration   [x] Evaluation   [x]Low   []Moderate   []High  25 1    [] Modalities        [x] Manual Therapy  15 1    [x] Thera Exercise  15 1    [] Thera Activity       [] Vasocompression       [] Aquatics       [] Other       Total Treatment Time  55 3      Time In: 9:45am   Time Out: 10:45 am    Electronically signed by: LUCINDA Palma. Wendy Bull PT       Physician Signature: ___________________________Date: ________________  By signing above or cosigning this note, I have reviewed this plan of care and certify a need for medically necessary rehabilitation services.      *PLEASE SIGN ABOVE AND FAX BACK ALL PAGES*

## 2021-11-23 ENCOUNTER — HOSPITAL ENCOUNTER (OUTPATIENT)
Dept: PHYSICAL THERAPY | Facility: CLINIC | Age: 46
Setting detail: THERAPIES SERIES
Discharge: HOME | End: 2021-11-23
Payer: COMMERCIAL

## 2021-11-23 PROCEDURE — 97140 MANUAL THERAPY 1/> REGIONS: CPT

## 2021-11-23 PROCEDURE — 97110 THERAPEUTIC EXERCISES: CPT

## 2021-11-23 NOTE — FLOWSHEET NOTE
[x] [facility] and Therapy    [address] [place] [address]    Phone: [phone]    Fax:  [phone]       Physical Therapy Daily Treatment Note    Date:  2021  Patient Name:  Queen Pop   :  1975  MRN: [id number]  Physician: Dr. Mark Robertson: [facility] [place]   Medical Diagnosis:   L achilles tendonitis, L plantar fasciitis, L hip flexor tightness   Rehab Codes: M76.62,  M25.552, M72.2  Onset Date:    10/18/21           Next 's Appt: prn   Visits:     Cancels/No shows: 0/0      Subjective:    Pain:  [] Yes  [] No Location:  Pain Rating: (0-10 scale) /10  Pain altered Tx:  [x] No  [] Yes  Action:  Comments: she could feel after her last treatment, but was ok by the next morning. She ran 4 miles this morning and felt pretty good  1/10 at the most.  She has been stretching. Objective:  Manual:  DI and hawkgrip to L plantar fascia, DI to L gastroc, Hypervolt to L gastroc/ soleus  Modalities: prn  Precautions: N/A  Exercises  Exercise     Sets  /Reps Resistance Completed  Issued HEP Comments    Burrito Stretch  3x30\"     X L only    Eccentric heel raises  3x10 x6 sec  10 lbs X X 2 up/ 1 down   Alternate sides   BAPS 3x10 L2 X                                                                                Other:     Specific Instructions for next treatment session: Provide Manual as needed, progress dynamic strengthening exercises to LLE, complete running analysis     Treatment Charges: Mins Units   []  Phys perf test     [x]  Ther Exercise 10 1   [x]  Manual Therapy 30 2   []  Ther Activities     []  NMR     []  Vasocompression     []  Other     Total Treatment time 40 3       Assessment: [x] Progressing toward goals. Decreased tenderness on calf, but remains exquisitely tender on her PF. Added weight for 1 legged calf raises (eccentrically). [] No change.      [] Other:    Goals:  Short Term Goals (To be met in 4 treatments)     1. ? Pain: Patient will display no pain with palpation to LLE structures (above) to facilitate improved functional mobility, running, and prevent prevent further injuries. 2. ? Tenderness: Patient will display no tenderness assessed by palpation to LLE structures (above) to facilitate improved functional mobility, running, and prevent prevent further injuries. 3. ? Function: Patient will display proper running mechanics to ensure no display of faults with activity to prevent further risk for injuries assessed by running analysis. 4. Patient to be independent with home exercise program as demonstrated by performance with correct form without cues.     Long Term Goals (To be met in 8 treatments)  1. Patient will display and increase in score on UWRI from 29/36 to 34/36 or greater to show improvement in running with association of pain/ injury.      Patient Goals: To improve running mechanics and maintain abimael.        Pt. Education:  [x] Yes  [] No  [x] Reviewed Prior HEP/Ed  Method of Education: [] Verbal  [x] Demo  [] Written  Comprehension of Education:  [] Verbalizes understanding. [] Demonstrates understanding. [x] Needs review. [] Demonstrates/verbalizes HEP/Ed previously given. Plan: [x] Continue per plan of care.  1x/wk   [] Other:     Time In:  1700         Time Out: [address]    Electronically signed by:  Gualberto Macias, PT

## 2021-12-07 ENCOUNTER — HOSPITAL ENCOUNTER (OUTPATIENT)
Dept: PHYSICAL THERAPY | Facility: CLINIC | Age: 46
Setting detail: THERAPIES SERIES
Discharge: HOME | End: 2021-12-07
Payer: COMMERCIAL

## 2021-12-07 PROCEDURE — 97140 MANUAL THERAPY 1/> REGIONS: CPT

## 2021-12-07 PROCEDURE — 97110 THERAPEUTIC EXERCISES: CPT

## 2021-12-07 NOTE — FLOWSHEET NOTE
[x] [facility] and Therapy    [address] [place] [address]    Phone: [phone]    Fax:  [phone]       Physical Therapy Daily Treatment Note    Date:  2021  Patient Name:  Ian Johnson   :  1975  MRN: [id number]  Physician: Dr. Wakefield Overall: [address] [facility] [place] Diagnosis:   L achilles tendonitis, L plantar fasciitis, L hip flexor tightness   Rehab Codes: M76.62,  M25.552, M72.2  Onset Date:    10/18/21           Next 's Appt: prn   Visits: 3/30    Cancels/No shows: 0/0      Subjective:    Pain:  [] Yes  [] No Location:  Pain Rating: (0-10 scale) /10  Pain altered Tx:  [x] No  [] Yes  Action:  Comments: she reports that she was in pain. Ran a little bit on vacation. Ran 4 on Saturday.  just felt tight. Ran 2 miles yesterday and felt fine. 1 mile warm up/1 mile cool down. This morning, she ran 3 miles total this morning (2+1). Not in pain    Objective:  Manual:  STM and IASTM via sidekick  to L plantar fascia, not to calf as it is not tender  Modalities: Game Ready PRN  Precautions: standard  Exercises  Exercise     Sets  /Reps Resistance Completed  Issued HEP Comments    Burrito Stretch  3x30\"      L only    Eccentric heel raises  3x10 x6 sec  15 lbs X X 2 up/ 1 down   1 min rest interval   BAPS 3x10 L2 X  x     Lax ball  20     x Across ball of forefoot.                                                               Other:     Specific Instructions for next treatment session:   1. Perform running analysis  2. Continue with manual and kerwin    Treatment Charges: Mins Units   []  Phys perf test     [x]  Ther Exercise 15 1   [x]  Manual Therapy 25 2   []  Ther Activities     []  NMR     []  Vasocompression     []  Other     Total Treatment time 40 3       Assessment: [x] Progressing toward goals. No tenderness on calf, and decreased tenderness on her PF.   Increased weight from 12 to 15 lbs for 1

## 2021-12-21 ENCOUNTER — HOSPITAL ENCOUNTER (OUTPATIENT)
Dept: PHYSICAL THERAPY | Facility: CLINIC | Age: 46
Setting detail: THERAPIES SERIES
Discharge: HOME | End: 2021-12-21
Payer: COMMERCIAL

## 2021-12-21 PROCEDURE — 97110 THERAPEUTIC EXERCISES: CPT

## 2021-12-21 PROCEDURE — 97112 NEUROMUSCULAR REEDUCATION: CPT

## 2021-12-21 PROCEDURE — 97140 MANUAL THERAPY 1/> REGIONS: CPT

## 2021-12-21 NOTE — FLOWSHEET NOTE
[x] [facility] and Therapy    [address] [place] [address]    Phone: [phone]    Fax:  [phone]       Physical Therapy Daily Treatment Note    Date:  2021  Patient Name:  Arnie Paige   :  1975  MRN: [id number]  Physician: Dr. Milton Ceja: [address] [facility] Diagnosis:   L achilles tendonitis, L plantar fasciitis, L hip flexor tightness   Rehab Codes: M76.62,  M25.552, M72.2  Onset Date:    10/18/21           Next 's Appt: prn   Visits:     Cancels/No shows: 0/0      Subjective:    Pain:  [] Yes  [] No Location:  Pain Rating: (0-10 scale) /10  Pain altered Tx:  [x] No  [] Yes  Action:  Comments: she did 3 miles each day Monday through Friday. Ran 12 miles/walked 3 miles. She was very sore on friday. The pain in the arch is \"incredible\". Wore tennis shoes today and can feel it in her heel, but the arch doesn't hurt today. Objective:  Manual:  STM and IASTM via sidekick  to L plantar fascia, not to calf as it is not tender  Modalities: Game Ready PRN  Precautions: standard  Exercises  Exercise     Sets  /Reps Resistance Completed  Issued HEP Comments    Burrito Stretch  3x30\"      L only    Eccentric heel raises  3x10 x6 sec  15 lbs X  2 up/ 1 down   1 min rest interval   BAPS 3x10 L2 X      Lax ball  20      Across ball of forefoot.     MOBO              functional reach 10x Fins at 2/4   x      split squat hops 10x   x x                      Other:   NMR   Pace:  11:06 min/mile    Shoes: [employer]   Shanita: 170  spm    Frontal plane deviations:    Decreased pronation         Sagittal plane deviations:      full knee extension at initial contact    Elevated foot ground angle at initial contact    Knees extend over toes at midstance    Increased backward trunk lean       Other:      Recommendations:     1. Run barefoot for 2 min at the beginning of a run with verbal cue to run quiet    2. Specific Instructions for next treatment session:   1. Add eccentric loading ex  2. Spend a fair amount of time working on improving LE mechanics at initial contact during the loading phase. She was instructed not to run the day before and day of next visit. 3.  Review tenderness on post tib, FHL and FDL    Treatment Charges: Mins Units   []  Phys perf test     [x]  Ther Exercise 25 2   [x]  Manual Therapy 16 1   []  Ther Activities     [x]  NMR 15 1   []  Vasocompression     []  Other     Total Treatment time 56 4       Assessment: [x] Progressing toward goals. she remains very tender on PF. Great toe ext is limited with ROM and snapping during barefoot running. She is cleared to run but not to the level that she did last week. [] No change. [] Other:    Goals:  Short Term Goals (To be met in 4 treatments)     1. ? Pain: Patient will display no pain with palpation to LLE structures (above) to facilitate improved functional mobility, running, and prevent prevent further injuries. 2. ? Tenderness: Patient will display no tenderness assessed by palpation to LLE structures (above) to facilitate improved functional mobility, running, and prevent prevent further injuries. 3. ? Function: Patient will display proper running mechanics to ensure no display of faults with activity to prevent further risk for injuries assessed by running analysis. 4. Patient to be independent with home exercise program as demonstrated by performance with correct form without cues.     Long Term Goals (To be met in 8 treatments)  1. Patient will display and increase in score on UWRI from 29/36 to 34/36 or greater to show improvement in running with association of pain/ injury.      Patient Goals: To improve running mechanics and maintain abimael.        Pt.  Education:  [x] Yes  [] No  [x] Reviewed Prior HEP/Ed  Method of Education: [] Verbal  [x] Demo  [x] Written  Comprehension of Education:  [] [employer] understanding. [] Demonstrates understanding. [x] Needs review. [] Demonstrates/verbalizes HEP/Ed previously given. Plan: [x] Continue per plan of care. 1x/wk.     [] Other:     Time In: 1907   Time Out: [address]    Electronically signed by:  Kellie Ceja PT

## 2021-12-28 ENCOUNTER — HOSPITAL ENCOUNTER (OUTPATIENT)
Dept: PHYSICAL THERAPY | Facility: CLINIC | Age: 46
Setting detail: THERAPIES SERIES
Discharge: HOME | End: 2021-12-28
Payer: COMMERCIAL

## 2021-12-28 PROCEDURE — 97112 NEUROMUSCULAR REEDUCATION: CPT

## 2021-12-28 NOTE — FLOWSHEET NOTE
[x] [facility]    [address] [place] [address]    Phone: [phone]    Fax:  [phone]       Physical Therapy Daily Treatment Note    Date:  2021  Patient Name:  Sadia Elizabeth   :  1975  MRN: [id number]  Physician: Dr. Solano Vale: [address] [facility] Diagnosis:   L achilles tendonitis, L plantar fasciitis, L hip flexor tightness   Rehab Codes: M76.62,  M25.552, M72.2  Onset Date:    10/18/21           Next 's Appt: prn   Visits:     Cancels/No shows: 0/0      Subjective:    Pain:  [] Yes  [] No Location:  Pain Rating: (0-10 scale) /10  Pain altered Tx:  [x] No  [] Yes  Action:  Comments: ran last Friday for 3 miles and it felt during the run and a little sore afterwards. Into the afternoon. Only run 1x since last visit. , but is not functionally limiting. Objective:  Manual:  STM and IASTM via sidekick  to L plantar fascia, not to calf as it is not tender  Modalities: Game Ready PRN  Precautions: standard  Exercises  Exercise     Sets  /Reps Resistance Completed  Issued HEP Comments    Burrito Stretch  3x30\"      L only    Eccentric heel raises  3x10 x6 sec  15 lbs X  2 up/ 1 down   1 min rest interval   BAPS 3x10 L2 X      Lax ball  20      Across ball of forefoot.     MOBO              functional reach 10x Fins at 2/4   x      split squat hops 10x   x x                      Other:   NMR   Pace:  11:19 min/mile    Shoes: Contreras Ghost   Shanita: 162 spm    Frontal plane deviations:    Decreased pronation         Sagittal plane deviations:      full knee extension at initial contact    Elevated foot ground angle at initial contact    Knees extend over toes at midstance    Increased backward trunk lean       Other: tried verbal cue of \"land more on your toes\", run barefoot,  and increased 3% incline, but she was not able to modify her technique.   Not until she was given a 175 abimael, did she land with a more perpendicular tibia. Recommendations:     1. Increased abimael to 175 spm               Specific Instructions for next treatment session:   1. Add eccentric loading ex  2. She is to consider purchasing a [employer]1 so that she can monitor her pace and abimael. Treatment Charges: Mins Units   []  Phys perf test     []  Ther Exercise     []  Manual Therapy     []  Ther Activities     [x]  NMR 40 3   []  Vasocompression     []  Other     Total Treatment time 40 3       Assessment: [x] Progressing toward goals. she had no pain with the elevated abimael on today's run. Had to make it a run/walk so that she had the chance to recover due to a higher level of exertion. She can decrease frequency of STM and it's ok if she is  on medial calf. [] No change. [] Other:    Goals:  Short Term Goals (To be met in 4 treatments)     1. ? Pain: Patient will display no pain with palpation to LLE structures (above) to facilitate improved functional mobility, running, and prevent prevent further injuries. 2. ? Tenderness: Patient will display no tenderness assessed by palpation to LLE structures (above) to facilitate improved functional mobility, running, and prevent prevent further injuries. 3. ? Function: Patient will display proper running mechanics to ensure no display of faults with activity to prevent further risk for injuries assessed by running analysis. 4. Patient to be independent with home exercise program as demonstrated by performance with correct form without cues.     Long Term Goals (To be met in 8 treatments)  1. Patient will display and increase in score on UWRI from 29/36 to 34/36 or greater to show improvement in running with association of pain/ injury.      Patient Goals: To improve running mechanics and maintain abimael.        Pt.  Education:  [x] Yes  [] No  [x] Reviewed Prior HEP/Ed  Method of Education: [x] Verbal  [] Demo  []

## 2022-01-04 ENCOUNTER — APPOINTMENT (OUTPATIENT)
Dept: PHYSICAL THERAPY | Facility: CLINIC | Age: 47
End: 2022-01-04
Payer: COMMERCIAL

## 2022-01-06 ENCOUNTER — APPOINTMENT (OUTPATIENT)
Dept: PHYSICAL THERAPY | Facility: CLINIC | Age: 47
End: 2022-01-06
Payer: COMMERCIAL

## 2022-02-01 ENCOUNTER — HOSPITAL ENCOUNTER (OUTPATIENT)
Dept: PHYSICAL THERAPY | Facility: CLINIC | Age: 47
Setting detail: THERAPIES SERIES
Discharge: HOME | End: 2022-02-01
Payer: COMMERCIAL

## 2022-02-01 NOTE — FLOWSHEET NOTE
[] [facility]  [address]    [phone]  F: [phone]   [] [address] [facility]   [address]  P: [phone]  F: [phone]  [] [address]  [address]  P: [phone]  F: [phone] [x] [address]  P: [phone]  F: [phone]  [] [address] [facility]   [address]   [place]: [phone]  F: [phone]   [] [place] [id number] [facility] [address]  [place]: [phone]   F: [phone]     Physical Therapy Cancel/No Show note    Date: 2022  Patient: Amy Lozano  : 1975  MRN: [id number]    Cancels/No Shows to date: 1 cx / 0 ns    For today's appointment patient:    [x]  Cancelled    [] Rescheduled appointment    [] No-show     Reason given by patient:    []  Patient ill    []  Conflicting appointment    [] No transportation      [] Conflict with work    [] No reason given    [] Weather related    [] COVID-19    [x] Other:      Comments:  Patient stated she has to go out of town and will call back to reschedule since she is not sure how long she will be gone.        [] Next appointment was confirmed    Electronically signed by: Shiva Sinclair

## 2022-02-14 ENCOUNTER — HOSPITAL ENCOUNTER (OUTPATIENT)
Dept: PHYSICAL THERAPY | Facility: CLINIC | Age: 47
Setting detail: THERAPIES SERIES
Discharge: HOME | End: 2022-02-14
Payer: COMMERCIAL

## 2022-02-14 PROCEDURE — 97140 MANUAL THERAPY 1/> REGIONS: CPT

## 2022-02-14 NOTE — FLOWSHEET NOTE
[x] [address] [facility] [address] [facility]    [address] [place] [address]    Phone: [phone]    Fax:  [phone]       Physical Therapy Daily Treatment Note    Date:  2022  Patient Name:  Tamar Murdock   :  1975  MRN: [id number]  Physician: Dr. Valles Drivers: [address] [employer] Diagnosis:   L achilles tendonitis, L plantar fasciitis, L hip flexor tightness   Rehab Codes: M76.62,  M25.552, M72.2  Onset Date:    10/18/21           Next 's Appt: prn   Visits:     Cancels/No shows: 0/0      Subjective:    Pain:  [] Yes  [] No Location:  Pain Rating: (0-10 scale) /10  Pain altered Tx:  [x] No  [] Yes  Action:  Comments: ran 4 and 3 miles and felt increased L medial calcaneal pain and has not run since. As for HEP, she hasn't done as much as usual due to be OOT. Objective:  Manual:  STM and IASTM via sidekick  to L plantar fascia, not to calf as it is not tender  Modalities: Game Ready PRN  Precautions: standard  Exercises  Exercise     Sets  /Reps Resistance Completed  Issued HEP Comments    Burrito Stretch  3x30\"      L only    Eccentric heel raises  3x10 x6 sec  15 lbs X  2 up/ 1 down   1 min rest interval   BAPS 3x10 L2 X      Lax ball  20      Across ball of forefoot.     MOBO              functional reach 10x Fins at 2/4         split squat hops 10x   x       Supine augusta calf stretch 3x30\"     X        Other:   Manual  1. STM to L gastroc/soleus, post tib, FDL and FHL.      NMR   Pace:  11:19 min/mile    Shoes: [employer]ost   Shanita: 162 spm    Frontal plane deviations:    Decreased pronation         Sagittal plane deviations:      full knee extension at initial contact    Elevated foot ground angle at initial contact    Knees extend over toes at midstance    Increased backward trunk lean       Other: tried verbal cue of \"land more on your toes\", run barefoot,  and increased 3% incline, but she was not able to modify her technique. Not until she was given a 175 abimael, did she land with a more perpendicular tibia. Recommendations:     1. Increased abimael to 175 spm               Specific Instructions for next treatment session:   1. Resume STM and HEP   2. Cleared to run 1-1.5 miles/run and increase every 2-3 runs. Easy pace. 3.  Add eccentric loading ex  2. She is to consider purchasing a LANRPN 269 so that she can monitor her pace and abimael. Treatment Charges: Mins Units   []  Phys perf test     []  Ther Exercise     [x]  Manual Therapy 45 3   []  Ther Activities     []  NMR     []  Vasocompression     []  Other     Total Treatment time 45 3       Assessment: [x] Progressing toward goals. + Tinels with tarsal tunnel. Significant tenderness on calf, post tib, PF, limited GTE. She did too much for her first runs back especially with no compliance to HEP      [] No change. [] Other:    Goals:  Short Term Goals (To be met in 4 treatments)     1. ? Pain: Patient will display no pain with palpation to LLE structures (above) to facilitate improved functional mobility, running, and prevent prevent further injuries. 2. ? Tenderness: Patient will display no tenderness assessed by palpation to LLE structures (above) to facilitate improved functional mobility, running, and prevent prevent further injuries. 3. ? Function: Patient will display proper running mechanics to ensure no display of faults with activity to prevent further risk for injuries assessed by running analysis. 4. Patient to be independent with home exercise program as demonstrated by performance with correct form without cues.     Long Term Goals (To be met in 8 treatments)  1. Patient will display and increase in score on UWRI from 29/36 to 34/36 or greater to show improvement in running with association of pain/ injury.      Patient Goals: To improve running mechanics and maintain abimael.        Pt.  Education:  [x] Yes [] No  [x] Reviewed Prior HEP/Ed  Method of Education: [x] Verbal  [] Demo  [] Written  Comprehension of Education:  [] Verbalizes understanding. [] Demonstrates understanding. [x] Needs review. [] Demonstrates/verbalizes HEP/Ed previously given. Plan: [x] Continue per plan of care. 1x/wk.     [] Other:     Time In: 1630   Time Out: 1728    Electronically signed by:  Jr Zarco PT

## 2022-02-21 ENCOUNTER — HOSPITAL ENCOUNTER (OUTPATIENT)
Dept: PHYSICAL THERAPY | Facility: CLINIC | Age: 47
Setting detail: THERAPIES SERIES
Discharge: HOME | End: 2022-02-21
Payer: COMMERCIAL

## 2022-02-21 PROCEDURE — 97110 THERAPEUTIC EXERCISES: CPT

## 2022-02-21 PROCEDURE — 97140 MANUAL THERAPY 1/> REGIONS: CPT

## 2022-02-21 NOTE — FLOWSHEET NOTE
[x] [facility]    [address] [place] [address]    Phone: [phone]    Fax:  [phone]       Physical Therapy Daily Treatment Note    Date:  2022  Patient Name:  Maryanne Malone   :  1975  MRN: [id number]  Physician: Dr. Eron Rodriguez: [address] [facility] Diagnosis:   L achilles tendonitis, L plantar fasciitis, L hip flexor tightness   Rehab Codes: M76.62,  M25.552, M72.2  Onset Date:    10/18/21           Next 's Appt: prn   Visits:     Cancels/No shows: 0/0      Subjective:    Pain:  [] Yes  [] No Location:  Pain Rating: (0-10 scale) 1/10  Pain altered Tx:  [x] No  [] Yes  Action:  Comments: ran 2 x1.5 miles and feels better. Back to exercising better. Objective:  Modalities: Game Ready PRN  Precautions: standard  Exercise     Sets  /Reps Resistance Completed  Comments    Burrito Stretch  3x30\"     L only    Eccentric heel raises  3x10 x6 sec  15 lbs X 2 up/ 1 down   1 min rest interval   BAPS 3x15 L3 X     Lax ball  20     Across ball of forefoot.     MOBO           Lateral step downs 2x10 6\" X     functional reach 10x Fins at 2/4        split squats 20   X      Supine augusta calf stretch 3x30\"   X        Other:   Manual  1. STM to L gastroc/soleus, post tib, FDL and FHL. NMR   Pace:  11:19 min/mile    Shoes: Contreras Ghost   Shanita: 162 spm    Frontal plane deviations:    Decreased pronation         Sagittal plane deviations:      full knee extension at initial contact    Elevated foot ground angle at initial contact    Knees extend over toes at midstance    Increased backward trunk lean       Other: tried verbal cue of \"land more on your toes\", run barefoot,  and increased 3% incline, but she was not able to modify her technique. Not until she was given a 175 shanita, did she land with a more perpendicular tibia. Recommendations:     1.  Increased shanita to 175 spm Specific Instructions for next treatment session:   1. Continue STM to PF and add Sidekick back in    2. Cleared to run 1.5-2.5 miles/run and increase every 2-3 runs. Easy pace. 3.  Add eccentric loading ex  2. She is to consider purchasing a ZCVLFB 778 or 55 so that she can monitor her pace and abimael. Treatment Charges: Mins Units   []  Phys perf test     [x]  Ther Exercise 50 3   [x]  Manual Therapy 15 1   []  Ther Activities     []  NMR     []  Vasocompression     []  Other     Total Treatment time 65 4       Assessment: [x] Progressing toward goals. Neg Tinels with tarsal tunnel. Decreased tenderness on L calf. Advanced her HEP for L LE stability with emphasis on glute max and med. After STM, the discomfort in her arch was resolved. Issued HEP. [] No change. [] Other:    Goals:  Short Term Goals (To be met in 4 treatments)     1. ? Pain: Patient will display no pain with palpation to LLE structures (above) to facilitate improved functional mobility, running, and prevent prevent further injuries. 2. ? Tenderness: Patient will display no tenderness assessed by palpation to LLE structures (above) to facilitate improved functional mobility, running, and prevent prevent further injuries. 3. ? Function: Patient will display proper running mechanics to ensure no display of faults with activity to prevent further risk for injuries assessed by running analysis. 4. Patient to be independent with home exercise program as demonstrated by performance with correct form without cues.     Long Term Goals (To be met in 8 treatments)  1. Patient will display and increase in score on UWRI from 29/36 to 34/36 or greater to show improvement in running with association of pain/ injury.      Patient Goals: To improve running mechanics and maintain abimael.        Pt.  Education:  [x] Yes  [] No  [x] Reviewed Prior HEP/Ed  Method of Education: [] Verbal  [x] Demo  [x] Written  Access Code: [email or web address]  URL: [email or web address]  Date: 02/21/2022  Prepared by: Cody Burkett    Exercises  Single Leg Bridge - 2 x daily - 7 x weekly - 2 sets - 10 reps  Prone heel squeeze + lift - 2 x daily - 7 x weekly - 2 sets - 15 reps  Lateral Step Down - 2 x daily - 7 x weekly - 2 sets - 15-20 reps  Side Stepping with Resistance at Feet - 2 x daily - 7 x weekly - 4 sets - 10-15 reps  Eccentric Heel Lowering on Step - 2 x daily - 7 x weekly - 3 sets - 10-15 reps      Comprehension of Education:  [] Verbalizes understanding. [] Demonstrates understanding. [x] Needs review. [] Demonstrates/verbalizes HEP/Ed previously given. Plan: [x] Continue per plan of care. 1x/wk.     [] Other:     Time In: 1150   Time Out: 1010    Electronically signed by:  Cody Burkett PT

## 2022-02-28 ENCOUNTER — HOSPITAL ENCOUNTER (OUTPATIENT)
Dept: PHYSICAL THERAPY | Facility: CLINIC | Age: 47
Setting detail: THERAPIES SERIES
Discharge: HOME | End: 2022-02-28
Payer: COMMERCIAL

## 2022-02-28 PROCEDURE — 97110 THERAPEUTIC EXERCISES: CPT

## 2022-02-28 PROCEDURE — 97140 MANUAL THERAPY 1/> REGIONS: CPT

## 2022-02-28 NOTE — FLOWSHEET NOTE
[x] [facility]    [address] [place] [address]    Phone: [phone]    Fax:  [phone]       Physical Therapy Daily Treatment Note    Date:  2022  Patient Name:  Joan Gardiner   :  1975  MRN: [id number]  Physician: Dr. Kennedy Nipper: [facility] Diagnosis:   L achilles tendonitis, L plantar fasciitis, L hip flexor tightness   Rehab Codes: M76.62,  M25.552, M72.2  Onset Date:    10/18/21           Next 's Appt: prn   Visits:     Cancels/No shows: 0/0      Subjective:    Pain:  [] Yes  [] No Location:  Pain Rating: (0-10 scale) 1/10  Pain altered Tx:  [x] No  [] Yes  Action:  Comments: ran 6.5 total miles (2+2+2.5 miles + 2 miles walk) and feels better. No pain. Ran this morning and had minor discomfort (1/10) on L medial calcaneus. \"Last week was a great week! \"  Towel stretch is very effective     Objective:  Modalities: Game Ready PRN  Precautions: standard  Exercise     Sets  /Reps Resistance Completed  Comments    Burrito Stretch  3x30\"    X L only    Eccentric heel raises  3x10 x6 sec  15 lbs  2 up/ 1 down   1 min rest interval   BAPS 3x15 L3 X     Lax ball  20     Across ball of forefoot.     MOBO           Lateral step downs 2x10 6\"      functional reach 10x Fins at 2/4       split squats 20         Supine augusta calf stretch 3x30\"           Other:   Manual  1. IASTM to L  post tib, FDL and FHL and plantar fascia     NMR   Pace:  11:19 min/mile    Shoes: [employer] Ghost   Shanita: 162 spm    Frontal plane deviations:    Decreased pronation         Sagittal plane deviations:      full knee extension at initial contact    Elevated foot ground angle at initial contact    Knees extend over toes at midstance    Increased backward trunk lean       Other: tried verbal cue of \"land more on your toes\", run barefoot,  and increased 3% incline, but she was not able to modify her technique.   Not until she was given a 175 abimael, did she land with a more perpendicular tibia. Recommendations:     1. Increased abimael to 175 spm               Specific Instructions for next treatment session:   1. Continue STM to PF and add Sidekick back in    2. Cleared to run 2.5 miles/run and increase every 2-3 runs. Easy pace. 3.  Add eccentric loading ex  2. She is to consider purchasing a YPBWTX 242 or 55 so that she can monitor her pace and abimael. Treatment Charges: Mins Units   []  Phys perf test     [x]  Ther Exercise 5 1   [x]  Manual Therapy 33 2   []  Ther Activities     []  NMR     []  Vasocompression     []  Other     Total Treatment time 38 3       Assessment: [x] Progressing toward goals. She is progressing very well with only a minor exacerbation today. Decreasing tenderness on PF and medial calf. Pt opts to do strengthening ex at home. See in 2 wks. [] No change. [] Other:    Goals:  Short Term Goals (To be met in 4 treatments)     1. ? Pain: Patient will display no pain with palpation to LLE structures (above) to facilitate improved functional mobility, running, and prevent prevent further injuries. 2. ? Tenderness: Patient will display no tenderness assessed by palpation to LLE structures (above) to facilitate improved functional mobility, running, and prevent prevent further injuries. 3. ? Function: Patient will display proper running mechanics to ensure no display of faults with activity to prevent further risk for injuries assessed by running analysis. 4. Patient to be independent with home exercise program as demonstrated by performance with correct form without cues.     Long Term Goals (To be met in 8 treatments)  1. Patient will display and increase in score on UWRI from 29/36 to 34/36 or greater to show improvement in running with association of pain/ injury.      Patient Goals: To improve running mechanics and maintain abimael.        Pt.  Education:  [x] Yes  [] No  [x] Reviewed Prior HEP/Ed  Method of Education: [] Verbal  [x] Demo  [x] Written  Access Code: [email or web address]  URL: [email or web address]  Date: 02/21/2022  Prepared by: Aakash Chandler    Exercises  Single Leg Bridge - 2 x daily - 7 x weekly - 2 sets - 10 reps  Prone heel squeeze + lift - 2 x daily - 7 x weekly - 2 sets - 15 reps  Lateral Step Down - 2 x daily - 7 x weekly - 2 sets - 15-20 reps  Side Stepping with Resistance at Feet - 2 x daily - 7 x weekly - 4 sets - 10-15 reps  Eccentric Heel Lowering on Step - 2 x daily - 7 x weekly - 3 sets - 10-15 reps      Comprehension of Education:  [] Verbalizes understanding. [] Demonstrates understanding. [x] Needs review. [] Demonstrates/verbalizes HEP/Ed previously given. Plan: [x] Continue per plan of care. 1x/wk.     [] Other:     Time In: 3160   Time Out: 157 [facility]    Electronically signed by:  Aakash Chandler, PT

## 2022-03-14 ENCOUNTER — APPOINTMENT (OUTPATIENT)
Dept: PHYSICAL THERAPY | Facility: CLINIC | Age: 47
End: 2022-03-14
Payer: COMMERCIAL

## 2022-03-23 ENCOUNTER — HOSPITAL ENCOUNTER (OUTPATIENT)
Dept: PHYSICAL THERAPY | Facility: CLINIC | Age: 47
Setting detail: THERAPIES SERIES
Discharge: HOME | End: 2022-03-23
Payer: COMMERCIAL

## 2022-03-23 PROCEDURE — 97140 MANUAL THERAPY 1/> REGIONS: CPT

## 2022-03-23 NOTE — FLOWSHEET NOTE
[x] [facility]    [address] [place] [address]    Phone: [phone]    Fax:  [phone]       Physical Therapy Daily Treatment Note    Date:  3/23/2022  Patient Name:  Que Alexander   :  1975  MRN: [id number]  Physician: Dr. Chintan Coa: [address] [facility] Diagnosis:   L achilles tendonitis, L plantar fasciitis, L hip flexor tightness   Rehab Codes: M76.62,  M25.552, M72.2  Onset Date:    10/18/21           Next 's Appt: prn   Visits:     Cancels/No shows: 0/0      Subjective:    Pain:  [] Yes  [] No Location:  Pain Rating: (0-10 scale) 1/10  Pain altered Tx:  [x] No  [] Yes  Action:  Comments: up to 4 miles and painfree. Running 4 days/wk MTRS and increasing a 1/2 mile. Last week's mileage was 17 miles, and has had a few walk miles. She feels she is getting stronger glutes and improved awareness to calf stiffness. Pt requests manual only. Already did her HEP today. Objective:  Modalities: Game Ready PRN  Precautions: standard  Exercise     Sets  /Reps Resistance Completed  Comments    Burrito Stretch  3x30\"    X L only    Eccentric heel raises  3x10 x6 sec  15 lbs  2 up/ 1 down   1 min rest interval   BAPS 3x15 L3 X     Lax ball  20     Across ball of forefoot.     MOBO           Lateral step downs 2x10 6\"      functional reach 10x Fins at 2/4       split squats 20         Supine augusta calf stretch 3x30\"           Other:   Manual  1. IASTM to L  post tib, FDL and FHL and plantar fascia                Specific Instructions for next treatment session:       Treatment Charges: Mins Units   []  Phys perf test     []  Ther Exercise     [x]  Manual Therapy 25 2   []  Ther Activities     []  NMR     []  Vasocompression     []  Other     Total Treatment time 25 2       Assessment: [x] Progressing toward goals. She is doing very well as her running mileage is increasing and her pain has resolved. Very minimal tenderness on L medial calf. Mild to mod on L PF. At this time, she wishes to hold off on scheduling any further PT and will continue at home. [] No change. [] Other:    Goals:  Short Term Goals (To be met in 4 treatments)     1. ? Pain: Patient will display no pain with palpation to LLE structures (above) to facilitate improved functional mobility, running, and prevent prevent further injuries. 2. ? Tenderness: Patient will display no tenderness assessed by palpation to LLE structures (above) to facilitate improved functional mobility, running, and prevent prevent further injuries. 3. ? Function: Patient will display proper running mechanics to ensure no display of faults with activity to prevent further risk for injuries assessed by running analysis. 4. Patient to be independent with home exercise program as demonstrated by performance with correct form without cues.     Long Term Goals (To be met in 8 treatments)  1. Patient will display and increase in score on UWRI from 29/36 to 34/36 or greater to show improvement in running with association of pain/ injury.      Patient Goals: To improve running mechanics and maintain abimael.        Pt. Education:  [x] Yes  [] No  [x] Reviewed Prior HEP/Ed  Method of Education: [] Verbal  [x] Demo  [x] Written  Access Code: [email or web address]  URL: [email or web address]  Date: 02/21/2022  Prepared by: Maude Garcia    Exercises  Single Leg Bridge - 2 x daily - 7 x weekly - 2 sets - 10 reps  Prone heel squeeze + lift - 2 x daily - 7 x weekly - 2 sets - 15 reps  Lateral Step Down - 2 x daily - 7 x weekly - 2 sets - 15-20 reps  Side Stepping with Resistance at Feet - 2 x daily - 7 x weekly - 4 sets - 10-15 reps  Eccentric Heel Lowering on Step - 2 x daily - 7 x weekly - 3 sets - 10-15 reps      Comprehension of Education:  [] Verbalizes understanding. [] Demonstrates understanding. [x] Needs review.    [] Demonstrates/verbalizes HEP/Ed previously given. Plan: [] Continue per plan of care. 1x/wk. [x] Other: pt wishes to follow up PRN.   If no further appts by 5/23, then DC     Time In: 1700   Time Out: 157 [place]    Electronically signed by:  Tommy Patton, PT

## 2022-08-11 ENCOUNTER — OFFICE VISIT (OUTPATIENT)
Dept: ORTHOPEDIC SURGERY | Age: 47
End: 2022-08-11
Payer: COMMERCIAL

## 2022-08-11 VITALS
HEART RATE: 55 BPM | BODY MASS INDEX: 24.05 KG/M2 | DIASTOLIC BLOOD PRESSURE: 70 MMHG | SYSTOLIC BLOOD PRESSURE: 123 MMHG | WEIGHT: 168 LBS | HEIGHT: 70 IN

## 2022-08-11 DIAGNOSIS — M54.31 SCIATICA OF RIGHT SIDE: ICD-10-CM

## 2022-08-11 DIAGNOSIS — S76.301A RIGHT HAMSTRING INJURY, INITIAL ENCOUNTER: Primary | ICD-10-CM

## 2022-08-11 PROCEDURE — 99203 OFFICE O/P NEW LOW 30 MIN: CPT | Performed by: PHYSICIAN ASSISTANT

## 2022-08-11 PROCEDURE — 1036F TOBACCO NON-USER: CPT | Performed by: PHYSICIAN ASSISTANT

## 2022-08-11 PROCEDURE — G8428 CUR MEDS NOT DOCUMENT: HCPCS | Performed by: PHYSICIAN ASSISTANT

## 2022-08-11 PROCEDURE — G8420 CALC BMI NORM PARAMETERS: HCPCS | Performed by: PHYSICIAN ASSISTANT

## 2022-08-11 RX ORDER — METHYLPREDNISOLONE 4 MG/1
TABLET ORAL
Qty: 1 KIT | Refills: 0 | Status: SHIPPED | OUTPATIENT
Start: 2022-08-11

## 2022-08-11 NOTE — PROGRESS NOTES
[address] [place] [address] AND SPORTS MEDICINE  [address] [address]  Dept: [phone]    Ambulatory Orthopedic Consult      CHIEF COMPLAINT:    Chief Complaint   Patient presents with    Leg Pain     Right hamstring. 4+m. Pain after running on treadmill       HISTORY OF PRESENT ILLNESS:      Since April 2022 she was running at a quicker pace on a treadmill and then when she stepped off she had tightness in her right hip/buttock area. She has decreased her mileage and has been unable to run very much since then. A couple weeks after that happened she did run in the relay of [place] [employer] [place] where she states her entire foot went numb. She has seen a chiropractor and had deep tissue massage. She has iced and tried home stretching and exercises. Past Medical History:    No past medical history on file. Past Surgical History:    No past surgical history on file. CurrentMedications:   Current Outpatient Medications   Medication Sig Dispense Refill    methylPREDNISolone (MEDROL DOSEPACK) 4 MG tablet Take by mouth. 1 kit 0     No current facility-administered medications for this visit. Allergies:    Patient has no known allergies. Social History:   Social History     Socioeconomic History    Marital status:      Spouse name: None    Number of children: None    Years of education: None    Highest education level: None   Tobacco Use    Smoking status: Never    Smokeless tobacco: Never   Substance and Sexual Activity    Alcohol use: Yes     Comment: socially    Drug use: Never       Family History:  No family history on file. REVIEW OF SYSTEMS:  Review of Systems   Constitutional:  Negative for activity change, appetite change, fatigue and fever. Respiratory: Negative. Negative for apnea, cough, chest tightness and shortness of breath. Cardiovascular: Negative.   Negative for chest pain, palpitations and leg swelling. Gastrointestinal:  Negative for abdominal distention, abdominal pain, constipation, diarrhea, nausea and vomiting. Genitourinary:  Negative for difficulty urinating, dysuria and hematuria. Musculoskeletal:  Positive for gait problem and myalgias. Negative for arthralgias and joint swelling. Skin:  Negative for color change and rash. Neurological:  Positive for numbness. Negative for dizziness, weakness and headaches. Psychiatric/Behavioral:  Negative for sleep disturbance. I have reviewed the CC, HPI, ROS, PMH, FHX, Social History, and if not present in this note, I have reviewed in the patient's chart. I agree with the documentation provided by other staff and have reviewed their documentation prior to providing my signature indicating agreement. PHYSICAL EXAM:  /70   Pulse 55   Ht 5' 10\" (1.778 m)   Wt 168 lb (76.2 kg)   BMI 24.11 kg/m²  Body mass index is 24.11 kg/m². HIP- right  GEN:  Alert and oriented X 3, in no acute distress. GAIT:  The patient's gait was observed while entering the exam room and was noted to be non antalgic. The extremity is in anatomic alignment. SKIN:  Intact without rashes, lesions, or ulcerations. No obvious deformity or swelling. NEURO:  The patient responds to light touch throughout bilateral  LE. Patellar and Achilles reflexes are 2/4. VASC:  The bilateral LE is neurovascularly intact with  2/4 DP and  2/4 PT pulses. Brisk capillary refill. ROM: 0 degree flexion contracture, 100 degrees flexion, 60 degrees abduction, 40 degrees adduction, 30 internal rotation, 75 external rotation. MUSC:  Strength is 5/5 flexion, abduction, internal rotation, external rotation. PALP: The patient is not tender to palpation over the greater trochanter. Pain to palpation to the ischial tuberosity. No deformity of the hamstring tendon.   No piriformis pain or gluteus medius pain to palpation  TEST:  Log roll is negative. Negative no apparent leg length discrepancy. Negative Trendelenburg test.  Negative Anjel's test.  Negative C sign. No labral clunks. No pain on compression. Hamstring insertional pain at the ischial tuberosity  with straight leg raise. RADIOLOGY:   XR PELVIS (1-2 VIEWS)    Result Date: 8/11/2022  1 upright AP pelvis view and frog-leg view of the right hip reveal no fractures, dislocations or other bony abnormalities. No soft tissue abnormalities. No radiopaque foreign bodies or tumors noted. Impression: Negative radiograph of the pelvis       ASSESSMENT:  1. Right hamstring injury, initial encounter    2. Sciatica of right side        PLAN:  I reviewed the x-ray and she does not have any type of bony abnormality of her pelvis/hip. She has no arthritic changes noted. Her pain does not seem to be coming from her hip or her buttock area. It seems to be at the hamstring insertion at the ischial tuberosity that is causing the issue. I explained that the sciatic nerve rests very closely to that same area. That is what caused her numbness in her leg while she was running. Patient is very unhappy that she cannot do more than 2 miles without having significant pain. She also just states that it so tight that that is irritating. I explained to the patient that I would do a Medrol Dosepak to help take all the inflammation down and then have her follow-up with physical therapy. If she does not get better then we would consider an MRI of her pelvis attention ischial tuberosity and hamstring insertion. The patient states understanding. She will try to avoid any activities that bother her. I explained that I would shut down her running for at least the next couple of weeks. It is okay for her to do upper body. The patient states understanding and will go to physical therapy. She will see wanting the  at the [facility] office.   The patient will follow-up with me in 6 weeks if

## 2022-08-12 ASSESSMENT — ENCOUNTER SYMPTOMS
CHEST TIGHTNESS: 0
ABDOMINAL PAIN: 0
RESPIRATORY NEGATIVE: 1
NAUSEA: 0
CONSTIPATION: 0
VOMITING: 0
ABDOMINAL DISTENTION: 0
DIARRHEA: 0
COLOR CHANGE: 0
APNEA: 0
COUGH: 0
SHORTNESS OF BREATH: 0

## 2022-08-18 ENCOUNTER — HOSPITAL ENCOUNTER (OUTPATIENT)
Dept: PHYSICAL THERAPY | Facility: CLINIC | Age: 47
Setting detail: THERAPIES SERIES
Discharge: HOME | End: 2022-08-18
Payer: COMMERCIAL

## 2022-08-18 PROCEDURE — 97110 THERAPEUTIC EXERCISES: CPT

## 2022-08-18 PROCEDURE — 97161 PT EVAL LOW COMPLEX 20 MIN: CPT

## 2022-08-18 NOTE — CONSULTS
[x] [address] [facility]   Outpatient Rehabilitation &  Therapy  [address] [place] [address]  P: [phone]  F: [phone] [x] [address] [employer]  P: [phone]  F: [phone]        Physical Therapy Running Evaluation    Date:  2022  Patient: Sadia Elizabeth   : 1975  MRN: [id number]  Physician: Ana Tran PA-C    Insurance: [facility]; Arie yr; vs; auth after 30vs; PT/OT comb; $10 copay; no ded or coins; 1500/1345 remaining OOP  Medical Diagnosis: R hamstring strain  Rehab Codes: M79.661  Onset date:  22    Next 's appt.: Jina Greene    Subjective:   CC: R hamstring   HPI: Early April, she had a faster than normal treadmill run and stepped off with R hamstring pain . Ran [employer] and finished with paresthesias into R LE. Now, she \"pays for it\" the next day after a run. No longer has paresthesias. Went to [place] who did STM to hamstring and glut, US--~6x with very minimal effect. Went to [place], steroid pack. 4 days ago, went for a 2 mile walk. Next day, it ached the whole day. Has continued to do past HEP. Flares up leg press, split squats, Denies any LBP. Pain is located right under the cheek.   Stretching results in mild relief, as does doing a self release, not running.  + shoe off sign    PMHx: [x] Unremarkable [] Diabetes [] HTN  [] Pacemaker   [] MI/Heart Problems [] Cancer [] Arthritis  [] Other:              [x] Refer to full medical chart  In EPIC     Tests: [] X-Ray: [] MRI:  [] none:     Medications: [x] Refer to full medical record [] None [] Other:  Allergies:      [x] Refer to full medical record [] None [] Other:    Working:  [x] Normal Duty  [] Light Duty  [] Off D/T Condition  [] Retired    [] Not Employed    []  Disability  [] Other:           Return to work:     Job/ADL Description: [employer]  Next goal race: none    Pain:  [x] Yes  [] No   Location:   Pain Rating: hamstring    [x] ? Function: Not able to run as she wishes   [x] Gait Deviations TBD  [x]  UWRI score is 72% interference  [] Other:      STG: (to be met in 5 treatments)  ? Pain: <4/10 so that she can return to running  ? Strength: 5/5 and no pain with knee flexion MMT at 30 deg  ? Function: able to initiate   UWRI score to <35% interference  Independent with Home Exercise Programs    LTG: (to be met in 10 treatments)  No pain in R hamstring  UWRI score to <15% interfernce  Able to return to running up to 20 miles per week                    Patient goals: return to running    Rehab Potential:  [x] Good  [] Fair  [] Poor   Suggested Professional Referral:  [x] No  [] Yes:  Barriers to Goal Achievement[de-identified]  [x] No  [] Yes:  Domestic Concerns:  [x] No  [] Yes:    Pt. Education:  [x] Plans/Goals, Risks/Benefits discussed  [x] Home exercise program    Method of Education: [x] Verbal    [x] Demo  [x] Written  Access Code: [email or web address]  URL: [email or web address]  Date: 08/18/2022  Prepared by: Toña Verdugo    Exercises  Single Leg Bridge - 2 x daily - 4 x weekly - 2 sets - 10 reps  Bridge with Heels on Aleksandra Lowers - 2 x daily - 4 x weekly - 3 sets - 10 reps  Supine Bridge with Heels on Aleksandra Lowers and Knees Bent - 2 x daily - 4 x weekly - 2 sets - 5-10 reps  Supine Hamstring Curl on Swiss Ball - 2 x daily - 4 x weekly - 2 sets - 10 reps  Kneeling Eccentric Hamstring Strengthening with Caregiver - 1 x daily - 1 x weekly - 2-3 sets - 10 reps  Hamstring walk outs - 2 x daily - 4 x weekly - 2 sets - 10 reps    Comprehension of Education:  [] Verbalizes understanding. [] Demonstrates understanding. [] Needs Review. [] Demonstrates/verbalizes understanding of HEP/Ed previously given.     Treatment Plan:  [x] Therapeutic Exercise    [x] Therapeutic Activity  [x] Manual Therapy   [x] Alter G treadmill  [x] Phys perf test     [x] Vasocompression/Game Ready   [x] Neuromuscular Re-education [x] Instruction in HEP     [x] Aquatic Therapy                           Frequency:  1x/week for 10 visits    Todays Treatment:  Modalities:   Precautions: standard  Exercises:  Exercise Reps/ Time Weight/ Level Issued for HEP  Comments   Supine        2 legged bridges 20  x x    1 legged bridges 2x10  x x    PB hamstring curls 2x5  x x Hips to remain in neutral to ext   PB bridges 2x10  x x    Bridge marches *       Gym        Nordic hamstring curls *    1/2 legged   1/2 kneeling hip flexor stretch *       Hip thrusts        Step downs     Posterior and lateral   Posterior sling ex     On cable column   Ski jumper lunges        Functional reach        Reverse twisting lunge                                                                        Other:    Specific Instructions for next treatment:  Continue with glut and hamstring strengthening. Finish with game ready    Treatment Charges: Mins Units   [x] Evaluation       [x]  Low       []  Moderate       []  High  1   [] Phys perf test     [x]  Ther Exercise 25 2   []  Manual Therapy     []  Ther Activities     []  Aquatics     []  Vasocompression     []  NMR       TOTAL TREATMENT TIME: 63    Time in: 4520   Time Out:1740    Electronically signed by: Severo George PT        Physician Signature:________________________________Date:__________________  By signing above or cosigning this note, I have reviewed this plan of care and certify a need for medically necessary rehabilitation services.      *PLEASE SIGN ABOVE AND FAX BACK ALL PAGES*

## 2022-08-26 ENCOUNTER — HOSPITAL ENCOUNTER (OUTPATIENT)
Dept: PHYSICAL THERAPY | Facility: CLINIC | Age: 47
Setting detail: THERAPIES SERIES
Discharge: HOME | End: 2022-08-26
Payer: COMMERCIAL

## 2022-08-26 PROCEDURE — 97140 MANUAL THERAPY 1/> REGIONS: CPT

## 2022-08-26 PROCEDURE — 97110 THERAPEUTIC EXERCISES: CPT

## 2022-08-26 NOTE — FLOWSHEET NOTE
[x] [facility] and Therapy    [address], [place]    Phone: [phone]    Fax:  [phone]       Physical Therapy Daily Treatment Note    Date:  2022  Patient Name:  Marlyn Vasquez    :  1975  MRN: [id number]  Physician: Lefty Martinez PA-C         Insurance: [facility]; Arie yr; vs; auth after 30vs; PT/OT comb; $10 copay; no ded or coins; 1500/1345 remaining OOP  Medical Diagnosis: R hamstring strain      Rehab Codes: M79.661  Onset date:    22                                     Next 's appt.: Felipa Favre  Visit# / total visits: 2/10   Cancels/No Shows: 0/0    Subjective:    Pain:  [x] Yes  [] No Location:  Pain Rating: (0-10 scale) /10  Pain altered Tx:  [x] No  [] Yes  Action:  Comments: a little tender after IE. Walked a mile and was fine. 2 days later, she walked a 1.5 miles and was sore. It was \"not pain, it was stiffness\". Same time of day. Walk prior to ex. Both outside. She started the walk with stiffness. The longer the stride, the more she felt the symptoms.      Objective:  Modalities: none  Precautions: standard  Exercises:  Exercise Reps/ Time Weight/ Level Issued for HEP   Comments   Supine             2 legged bridges 20   x      1 legged bridges 2x10   x      PB hamstring curls 2x5   x  Hips to remain in neutral to ext   PB bridges 2x10   x      Diaphragmatic breathing 2x10     X     Prone        Diaphragmatic breathing 2x10   X    Quadruped        Diaphragmatic breathing 2x10   X    Gym             Nordic hamstring curls 2x10     X    1/2 kneeling hip flexor stretch *           Hip thrusts             Step downs         Posterior and lateral   Posterior sling ex         On cable column   Ski jumper lunges             Functional reach             Reverse twisting lunge Other: Manual   DI to piriformis in prone with hip ER  DI to proximal and distal hip flexor    Specific Instructions for next treatment:  Continue with glut and hamstring strengthening. Review breathing technique  Finish with game ready    Treatment Charges: Mins Units   []  Phys perf test     [x]  Ther Exercise 30 2   [x]  Manual Therapy 25 2   []  Ther Activities     []  NMR     []  Vasocompression     []  Other     Total Treatment time 55 4       Assessment: [x] Progressing toward goals. R hip ext was initally limited but was improved after manual.  After observing increased lordosis during nordic hamstring curls, reviewed and added TvA ex. Breathing technique was + increased scapular elevation and increased usage of accessory muscles. Resting tongue position was against the teeth rather than roof of mouth. She reports others making comments about her sighing. Reviewed diaphragmatic breathing in supine, prone, sitting, quad, and stnading. She reports that she can feel the fatigue in her legs at the end of the session from the nordic curls. 2x10.      [] No change. [] Other:    Goals:     STG: (to be met in 5 treatments)  ? Pain: <4/10 so that she can return to running  ? Strength: 5/5 and no pain with knee flexion MMT at 30 deg  ? Function: able to initiate   UWRI score to <35% interference  Independent with Home Exercise Programs     LTG: (to be met in 10 treatments)  No pain in R hamstring  UWRI score to <15% interfernce  Able to return to running up to 20 miles per week                     Patient goals: return to running      Pt. Education:  [x] Yes  [] No  [x] Reviewed Prior HEP/Ed  Method of Education: [x] Verbal    [x] Demo  Diaphragmatic breathing  Tongue position      [x] Written  Access Code: [email or web address]  URL: [email or web address]  Date: 08/18/2022  Prepared by: Tila Mclaughlin     Exercises  Single Leg Bridge - 2 x daily - 4 x weekly - 2 sets - 10 reps  Bridge with Heels on Swiss Ball - 2 x daily - 4 x weekly - 3 sets - 10 reps  Supine Bridge with Heels on Swiss Ball and Knees Bent - 2 x daily - 4 x weekly - 2 sets - 5-10 reps  Supine Hamstring Curl on Swiss Ball - 2 x daily - 4 x weekly - 2 sets - 10 reps  Kneeling Eccentric Hamstring Strengthening with Caregiver - 1 x daily - 1 x weekly - 2-3 sets - 10 reps  Hamstring walk outs - 2 x daily - 4 x weekly - 2 sets - 10 reps          Comprehension of Education:  [] Verbalizes understanding. [] Demonstrates understanding. [x] Needs review. [] Demonstrates/verbalizes HEP/Ed previously given. Plan: [x] Continue per plan of care.  1x/wk   [] Other:     Time In: 1500          Time Out: 9005    Electronically signed by:  Noam Preciado PT

## 2022-09-01 ENCOUNTER — HOSPITAL ENCOUNTER (OUTPATIENT)
Dept: PHYSICAL THERAPY | Facility: CLINIC | Age: 47
Setting detail: THERAPIES SERIES
Discharge: HOME | End: 2022-09-01
Payer: COMMERCIAL

## 2022-09-01 PROCEDURE — 97016 VASOPNEUMATIC DEVICE THERAPY: CPT

## 2022-09-01 PROCEDURE — 97110 THERAPEUTIC EXERCISES: CPT

## 2022-09-01 NOTE — FLOWSHEET NOTE
[x] [place] and Therapy    [address] [facility]    Phone: [phone]    Fax:  [phone]       Physical Therapy Daily Treatment Note    Date:  2022  Patient Name:  Santa Recinos    :  1975  MRN: [id number]  Physician: Jose Rodrigez PA-C         Insurance: [facility]; Arie yr; 30/26vs; auth after 30vs; PT/OT comb; $10 copay; no ded or coins; 1500/1345 remaining OOP  Medical Diagnosis: R hamstring strain      Rehab Codes: M79.661  Onset date:    22                                     Next 's appt.: Venkata Vila  Visit# / total visits: 3/10   Cancels/No Shows: 0/0    Subjective:    Pain:  [x] Yes  [] No Location: R proximal hamstring Pain Rating: (0-10 scale) 0.5-1/10  Pain altered Tx:  [x] No  [] Yes  Action:  Comments: she did her HEP this am.  Walking ~5000 steps/day. Working on postural and tongue position. She still feels it when walking and is frustrated with lack of progress.   Advised her that proximal hamstring issues do take a while to resolve    Objective:  Modalities:   Treatment Location  Left      Right                          Position   hamstring []          [x]  [x] Supine    [] Prone   [] Side lying  [] Sitting          Treatment Modality   2 Vasocompression    34° temp    Med pressure     15min   1 Other:  ex       Precautions: standard  Exercises:  Exercise Reps/ Time Weight/ Level Issued for HEP   Comments   Supine             2 legged bridges 20   x      1 legged bridges 2x10   x      PB hamstring curls 2x5   x  Hips to remain in neutral to ext   PB bridges 2x10   x      Diaphragmatic breathing 2x10          Prone        Diaphragmatic breathing 2x10       Quadruped        Diaphragmatic breathing 2x10       Gym            Nordic hamstring curls 2x10         1/2 kneeling hip flexor stretch *           Alexis walks  4x20' black   x     Step downs  2x10 6\"   x Lateral + w/ metronome at 85 spm   Posterior sling ex On cable column   Ski jumper lunges             Functional reach             Reverse twisting lunge                                          Walk/run 4'/1'x 6 3.0/5.3   x                                                                           Other:  Manual-held on today's date   DI to piriformis in prone with hip ER  DI to proximal and distal hip flexor    Specific Instructions for next treatment:  Continue with running   Finish with game ready    Treatment Charges: Mins Units   []  Phys perf test     [x]  Ther Exercise 43 3   []  Manual Therapy     []  Ther Activities     []  NMR     [x]  Vasocompression 15 1   []  Other     Total Treatment time 58 4       Assessment: [x] Progressing toward goals. R hip ROM was wnl at onset of PT. Able to initiate a return to running today with<3/10 pain. Pt was pleased with the progress of initiating a return to run program.  Finished with Game ready to minimize the level of irritability from running. [] No change. [] Other:    Goals:     STG: (to be met in 5 treatments)  ? Pain: <4/10 so that she can return to running  ? Strength: 5/5 and no pain with knee flexion MMT at 30 deg  ? Function: able to initiate   UWRI score to <35% interference  Independent with Home Exercise Programs     LTG: (to be met in 10 treatments)  No pain in R hamstring  UWRI score to <15% interfernce  Able to return to running up to 20 miles per week                     Patient goals: return to running      Pt. Education:  [x] Yes  [] No  [x] Reviewed Prior HEP/Ed  Method of Education: [x] Verbal    [x] Demo  Diaphragmatic breathing  Tongue position      [x] Written  Access Code: [email or web address]  URL: [email or web address]  Date: 08/18/2022  Prepared by: Leretha Boas     Exercises  Single Leg Bridge - 2 x daily - 4 x weekly - 2 sets - 10 reps  Bridge with Heels on The Spencer-Jim - 2 x daily - 4 x weekly - 3 sets - 10 reps  Supine Bridge with Heels on Swiss Ball and Knees Bent - 2 x daily - 4 x weekly - 2 sets - 5-10 reps  Supine Hamstring Curl on Swiss Ball - 2 x daily - 4 x weekly - 2 sets - 10 reps  Kneeling Eccentric Hamstring Strengthening with Caregiver - 1 x daily - 1 x weekly - 2-3 sets - 10 reps  Hamstring walk outs - 2 x daily - 4 x weekly - 2 sets - 10 reps          Comprehension of Education:  [] Verbalizes understanding. [] Demonstrates understanding. [x] Needs review. [] Demonstrates/verbalizes HEP/Ed previously given. Plan: [x] Continue per plan of care.  1x/wk   [] Other:     Time In: 1630          Time Out: 2653    Electronically signed by:  Lorena Beckwith, PT

## 2022-09-08 ENCOUNTER — HOSPITAL ENCOUNTER (OUTPATIENT)
Dept: PHYSICAL THERAPY | Facility: CLINIC | Age: 47
Setting detail: THERAPIES SERIES
Discharge: HOME | End: 2022-09-08
Payer: COMMERCIAL

## 2022-09-08 PROCEDURE — 97016 VASOPNEUMATIC DEVICE THERAPY: CPT

## 2022-09-08 PROCEDURE — 97110 THERAPEUTIC EXERCISES: CPT

## 2022-09-08 NOTE — FLOWSHEET NOTE
[x] [facility]    [address], [place]    Phone: [phone]    Fax:  [phone]       Physical Therapy Daily Treatment Note    Date:  2022  Patient Name:  Gabriella Juarez    :  1975  MRN: [id number]  Physician: Kevin Harris PA-C         Insurance: [facility]; A[place] yr; 30/26vs; auth after 30vs; PT/OT comb; $10 copay; no ded or coins; 1500/1345 remaining OOP  Medical Diagnosis: R hamstring strain      Rehab Codes: M79.661  Onset date:    22                                     Next 's appt.: Yeni Gutierrez  Visit# / total visits: 4/10   Cancels/No Shows: 0/0    Subjective:    Pain:  [x] Yes  [] No Location: R proximal hamstring Pain Rating: (0-10 scale) 0.5-1/10  Pain altered Tx:  [x] No  [] Yes  Action:  Comments:  today will be her 4th run with no hamstring issues. Agustin arches . Ice really helps. Definitely feels like she is moving the right direction  Objectives for today are to 1. Increase to 3'/2' from 4'/1' 2.   Finish with game ready    Objective:  Modalities:   Treatment Location  Left      Right                          Position   hamstring []          [x]  [x] Supine    [] Prone   [] Side lying  [] Sitting          Treatment Modality   2 Vasocompression    34° temp    Med pressure     15min   1 Other:  ex       Precautions: standard  Exercises:  Exercise Reps/ Time Weight/ Level Issued for HEP   Comments   Supine             2 legged bridges 20   x      1 legged bridges 2x10   x      PB hamstring curls 2x5   x  Hips to remain in neutral to ext   PB bridges 2x10   x      Diaphragmatic breathing 2x10          Prone        Diaphragmatic breathing 2x10       Quadruped        Diaphragmatic breathing 2x10       Gym            Nordic hamstring curls 2x10         1/2 kneeling hip flexor stretch *          Monster walks  4x20' black        Step downs  2x10 6\"    Lateral + w/ metronome at 85 spm   Posterior sling ex         On cable column   Ski jumper lunges             Functional reach             Reverse twisting lunge                                          Walk/run 3'/2''x 6 3.2/5.3   x                                                                           Other:  Manual-held on today's date   DI to piriformis in prone with hip ER  DI to proximal and distal hip flexor    Specific Instructions for next treatment:  Continue with running   Finish with game ready    Treatment Charges: Mins Units   []  Phys perf test     [x]  Ther Exercise 30 2   []  Manual Therapy     []  Ther Activities     []  NMR     [x]  Vasocompression 15 1   []  Other     Total Treatment time 45 3       Assessment: [x] Progressing toward goals. No pain with advancing her running program from 4'/1' walk/run to 3/2'. Finished with game ready    [] No change. [] Other:    Goals:     STG: (to be met in 5 treatments)  ? Pain: <4/10 so that she can return to running  ? Strength: 5/5 and no pain with knee flexion MMT at 30 deg  ? Function: able to initiate   UWRI score to <35% interference  Independent with Home Exercise Programs     LTG: (to be met in 10 treatments)  No pain in R hamstring  UWRI score to <15% interfernce  Able to return to running up to 20 miles per week                     Patient goals: return to running      Pt. Education:  [x] Yes  [] No  [x] Reviewed Prior HEP/Ed  Method of Education: [x] Verbal    [x] Demo  Reviewed STM to calf and plantar fascia  Continue with 3' walk/2' run for 3-4 more runs and then increase to 2'/3'         [x] Written  Access Code: [email or web address]  URL: [email or web address]  Date: 08/18/2022  Prepared by: Kuldeep De Paz     Exercises  Single Leg Bridge - 2 x daily - 4 x weekly - 2 sets - 10 reps  Bridge with Heels on The Spencer-Jim - 2 x daily - 4 x weekly - 3 sets - 10 reps  Supine Bridge with Heels on Swiss Ball and Knees Bent - 2 x daily - 4 x weekly - 2 sets - 5-10 reps  Supine Hamstring Curl on Swiss Ball - 2 x daily - 4 x weekly - 2 sets - 10 reps  Kneeling Eccentric Hamstring Strengthening with Caregiver - 1 x daily - 1 x weekly - 2-3 sets - 10 reps  Hamstring walk outs - 2 x daily - 4 x weekly - 2 sets - 10 reps          Comprehension of Education:  [] Verbalizes understanding. [] Demonstrates understanding. [x] Needs review. [] Demonstrates/verbalizes HEP/Ed previously given. Plan: [x] Continue per plan of care.  1x/wk   [] Other:     Time In: 5940          Time Out: 7283    Electronically signed by:  Tila Mclaughlin PT

## 2022-09-22 ENCOUNTER — OFFICE VISIT (OUTPATIENT)
Dept: ORTHOPEDIC SURGERY | Age: 47
End: 2022-09-22
Payer: COMMERCIAL

## 2022-09-22 VITALS
RESPIRATION RATE: 12 BRPM | DIASTOLIC BLOOD PRESSURE: 71 MMHG | HEIGHT: 70 IN | SYSTOLIC BLOOD PRESSURE: 109 MMHG | HEART RATE: 47 BPM | BODY MASS INDEX: 24.05 KG/M2 | WEIGHT: 168 LBS

## 2022-09-22 DIAGNOSIS — S76.301D RIGHT HAMSTRING INJURY, SUBSEQUENT ENCOUNTER: Primary | ICD-10-CM

## 2022-09-22 PROCEDURE — 1036F TOBACCO NON-USER: CPT | Performed by: PHYSICIAN ASSISTANT

## 2022-09-22 PROCEDURE — G8420 CALC BMI NORM PARAMETERS: HCPCS | Performed by: PHYSICIAN ASSISTANT

## 2022-09-22 PROCEDURE — 99213 OFFICE O/P EST LOW 20 MIN: CPT | Performed by: PHYSICIAN ASSISTANT

## 2022-09-22 PROCEDURE — G8427 DOCREV CUR MEDS BY ELIG CLIN: HCPCS | Performed by: PHYSICIAN ASSISTANT

## 2022-09-22 ASSESSMENT — ENCOUNTER SYMPTOMS
RESPIRATORY NEGATIVE: 1
SHORTNESS OF BREATH: 0
ABDOMINAL DISTENTION: 0
NAUSEA: 0
DIARRHEA: 0
CHEST TIGHTNESS: 0
COLOR CHANGE: 0
COUGH: 0
ABDOMINAL PAIN: 0
VOMITING: 0
CONSTIPATION: 0
APNEA: 0

## 2022-09-22 NOTE — PROGRESS NOTES
[address] [place] [address] [facility]  [address] [place] [address]  Dept: [phone]  Dept Fax: [phone]        Ambulatory Follow Up      Subjective:       Chief Complaint   Patient presents with    Leg Pain     Right hamstring       Date of Injury: April 2022    HPI:     Mik Ivy  is a 55 y.o.  female who presents today in for follow-up of right hamstring pain. She states that when she has pain, it is mostly in the posterior gluteal region. She states that she is not having numbness or tingling anymore. The patient was last seen on 8/11/2022 and underwent treatment in the form of physical therapy and Medrol dosepack. The patient notes 80% improvement with the previous treatment. She is currently continuing working with physical therapy. She is currently on a return to run protocol. She is up to a 2 minute walk and 3 minute run, and it is day two for these numbers. She states that she is having some pain when running but when she uses ice after, she feels okay for the rest of the day, but there is still some soreness after. She is currently doing strengthening exercises and stretching at home. Review of Systems   Constitutional:  Positive for activity change. Negative for appetite change, fatigue and fever. Respiratory: Negative. Negative for apnea, cough, chest tightness and shortness of breath. Cardiovascular: Negative. Negative for chest pain, palpitations and leg swelling. Gastrointestinal:  Negative for abdominal distention, abdominal pain, constipation, diarrhea, nausea and vomiting. Genitourinary:  Negative for difficulty urinating, dysuria and hematuria. Musculoskeletal:  Positive for myalgias. Negative for arthralgias, gait problem and joint swelling. Skin:  Negative for color change and rash. Neurological:  Negative for dizziness, weakness, numbness and headaches. Psychiatric/Behavioral:  Negative for sleep disturbance. Objective :   /71   Pulse (!) 47   Resp 12   Ht 5' 10\" (1.778 m)   Wt 168 lb (76.2 kg)   BMI 24.11 kg/m²  Body mass index is 24.11 kg/m². General: Shannan Dumont is a 55 y.o. female who is alert and oriented and sitting comfortably in our office. Right Hip     GEN: Alert and oriented X 3, in no acute distress. GAIT: The patient's gait was observed while entering the exam room and was noted to be non-antalgic. The extremity is in anatomic alignment. SKIN: Intact without rashes, lesions, or ulcerations. No obvious deformity or swelling. NEURO: The patient responds to light touch throughout right LE. Patellar and Achilles reflexes are 2/4. VASC: The right LE is neurovascularly intact with 2/4 DP and 2/4 PT pulses. Brisk capillary refill. ROM: 110 degrees flexion, 65 degrees abduction, 30 degrees adduction, 40 degrees of internal rotation, 60 degrees of external rotation. MUSC: Strength is 5/5 flexion, abduction, internal rotation, external rotation. PALP: The patient is not tender to palpation over the greater trochanter. No deformity to hamstring tendon. No piriformis pain or gluteus medius pain to palpation. TEST: Log roll is negative. No apparent leg length discrepancy. negative Stenchfield test. negative Impingement test. Negative Trendelenburg test. Positive Anjel's test with pain in medial thigh. Negative C sign. No labral clunks. No pain on compression. Radiology:   Reviewed previous x-ray    Procedure:   None    Assessment:      1. Right hamstring injury, subsequent encounter         Plan:   During today's visit, the patient states that she is happy with the improvement that has been made with improving pain and ROM in her right leg. She states that it is more of a discomfort and tightness at this time.  She should continue working with physical therapy to further improve strength and ROM, as well as continuing the return to run protocol. She may participate in activities as tolerated, without pushing herself too far. She should continue her home stretches and strengthening exercises to further improve strength and ROM in her right leg. The patient agrees with and understands the plan. The patient should follow up with as needed. The patient will call immediately with any problems. No orders of the defined types were placed in this encounter. No orders of the defined types were placed in this encounter. This note is created with the assistance of a speech recognition program.  While intending to generate a document that actually reflects the content of the visit, the document can still have some errors including those of syntax and sound a like substitutions which may escape proof reading. In such instances, actual meaning can be extrapolated by contextual diversion. Electronically signed by Shannan Carter on 9/22/2022 at 1:14 PM  IShannan, was involved in the care of this patient. Radha Puente PA-C, have personally seen this patient, reviewed the chart including history, and imaging if done. I personally  performed the physical exam and obtained any needed additional history. I placed orders, performed or supervised procedures and developed the treatment plan.     Electronically signed by Remi Parker PA-C, on 9/23/2022 at 2:47 PM

## 2022-09-29 ENCOUNTER — HOSPITAL ENCOUNTER (OUTPATIENT)
Dept: PHYSICAL THERAPY | Facility: CLINIC | Age: 47
Setting detail: THERAPIES SERIES
Discharge: HOME | End: 2022-09-29
Payer: COMMERCIAL

## 2022-09-29 NOTE — FLOWSHEET NOTE
[] [facility]) [facility] &  Therapy  [address]    [phone]  F: [phone]   [] [address]  [facility]   [address]  P: [phone]  F: [phone]  [] [address]  [address]  P: [phone]  F: [phone] [x] [address]  P: [phone]  F: [phone]  [] [address]  [address] [facility] [address]   [address]   [place]: [phone]  F: [phone]   [] [facility]  [address] [facility] [address]  [place]: [phone]   F: [phone]     Physical Therapy Cancel/No Show note    Date: 2022  Patient: Deanne Morgan  : 1975  MRN: [id number]    Cancels/No Shows to date:     For today's appointment patient:    [x]  Cancelled    [] Rescheduled appointment    [] No-show     Reason given by patient:    []  Patient ill    []  Conflicting appointment    [] No transportation      [] Conflict with work    [] No reason given    [] Weather related    [] COVID-19    [x] Other:      Comments:  pt reports that she is walk/running 1'/4' x 6 and is doing well. She reports she recovers quickly.  She requests to cancel her appt and keep open for 1 month      [] Next appointment was confirmed    Electronically signed by: Vinicio Vaca, PT

## 2025-04-15 ENCOUNTER — OFFICE VISIT (OUTPATIENT)
Age: 50
End: 2025-04-15

## 2025-04-15 VITALS
BODY MASS INDEX: 24.11 KG/M2 | OXYGEN SATURATION: 95 % | SYSTOLIC BLOOD PRESSURE: 116 MMHG | TEMPERATURE: 97.3 F | DIASTOLIC BLOOD PRESSURE: 77 MMHG | WEIGHT: 168 LBS | HEART RATE: 61 BPM

## 2025-04-15 DIAGNOSIS — L02.611 CELLULITIS AND ABSCESS OF TOE, RIGHT: Primary | ICD-10-CM

## 2025-04-15 DIAGNOSIS — L03.031 CELLULITIS AND ABSCESS OF TOE, RIGHT: Primary | ICD-10-CM

## 2025-04-15 RX ORDER — DOXYCYCLINE HYCLATE 100 MG
100 TABLET ORAL 2 TIMES DAILY
Qty: 14 TABLET | Refills: 0 | Status: SHIPPED | OUTPATIENT
Start: 2025-04-15 | End: 2025-04-22

## 2025-04-15 NOTE — PROGRESS NOTES
Susanne Jean (: 1975) is a 49 y.o. female, New patient, here for evaluation of the following complaint(s): Nail Problem (Onset  R Big toe )        History provided by:  Patient   used: No    Toe Pain   The incident occurred 3 to 5 days ago. The incident occurred at home. The injury mechanism is unknown. The pain is present in the right toes. The quality of the pain is described as aching. The pain is at a severity of 5/10. The pain is moderate. The pain has been Intermittent since onset. She reports no foreign bodies present. The symptoms are aggravated by weight bearing. She has tried NSAIDs for the symptoms. The treatment provided mild relief.        PAST MEDICAL HISTORY    No past medical history on file.    SURGICAL HISTORY    No past surgical history on file.    CURRENT MEDICATIONS    Current Outpatient Rx   Medication Sig Dispense Refill    methylPREDNISolone (MEDROL DOSEPACK) 4 MG tablet Take by mouth. (Patient not taking: Reported on 4/15/2025) 1 kit 0       ALLERGIES    Allergies   Allergen Reactions    Fentanyl Swelling    Propofol Swelling       FAMILY HISTORY    No family history on file.    SOCIAL HISTORY    Social History     Socioeconomic History    Marital status:      Spouse name: None    Number of children: None    Years of education: None    Highest education level: None   Tobacco Use    Smoking status: Never    Smokeless tobacco: Never   Substance and Sexual Activity    Alcohol use: Yes     Comment: socially    Drug use: Never     Social Drivers of Health     Financial Resource Strain: Low Risk  (3/10/2025)    Received from [facility]    Overall Financial Resource Strain (CARDIA)     Difficulty of Paying Living Expenses: Not very hard   Food Insecurity: No Food Insecurity (3/10/2025)    Received from [facility]    Hunger Vital Sign     Within the past 12 months, you worried that your food would run out before you got the

## 2025-04-18 ASSESSMENT — ENCOUNTER SYMPTOMS: RESPIRATORY NEGATIVE: 1
